# Patient Record
Sex: FEMALE | Race: WHITE | NOT HISPANIC OR LATINO | Employment: OTHER | URBAN - METROPOLITAN AREA
[De-identification: names, ages, dates, MRNs, and addresses within clinical notes are randomized per-mention and may not be internally consistent; named-entity substitution may affect disease eponyms.]

---

## 2017-02-14 DIAGNOSIS — Z12.31 ENCOUNTER FOR SCREENING MAMMOGRAM FOR MALIGNANT NEOPLASM OF BREAST: ICD-10-CM

## 2017-03-03 ENCOUNTER — HOSPITAL ENCOUNTER (OUTPATIENT)
Dept: RADIOLOGY | Age: 67
Discharge: HOME | End: 2017-03-03
Payer: COMMERCIAL

## 2017-03-03 DIAGNOSIS — Z12.31 ENCOUNTER FOR SCREENING MAMMOGRAM FOR MALIGNANT NEOPLASM OF BREAST: ICD-10-CM

## 2017-03-03 PROCEDURE — G0202 SCR MAMMO BI INCL CAD: HCPCS

## 2017-05-04 ENCOUNTER — TRANSCRIBE ORDERS (OUTPATIENT)
Dept: ADMINISTRATIVE | Facility: HOSPITAL | Age: 67
End: 2017-05-04

## 2017-05-04 ENCOUNTER — ALLSCRIPTS OFFICE VISIT (OUTPATIENT)
Dept: OTHER | Facility: OTHER | Age: 67
End: 2017-05-04

## 2017-05-04 DIAGNOSIS — N60.91 BENIGN MAMMARY DYSPLASIA OF RIGHT BREAST: Primary | ICD-10-CM

## 2017-05-08 ENCOUNTER — ALLSCRIPTS OFFICE VISIT (OUTPATIENT)
Dept: OTHER | Facility: OTHER | Age: 67
End: 2017-05-08

## 2017-05-08 DIAGNOSIS — Z01.419 ENCOUNTER FOR GYNECOLOGICAL EXAMINATION WITHOUT ABNORMAL FINDING: ICD-10-CM

## 2017-05-08 DIAGNOSIS — Z12.31 ENCOUNTER FOR SCREENING MAMMOGRAM FOR MALIGNANT NEOPLASM OF BREAST: ICD-10-CM

## 2017-05-08 PROCEDURE — 87624 HPV HI-RISK TYP POOLED RSLT: CPT | Performed by: OBSTETRICS & GYNECOLOGY

## 2017-05-08 PROCEDURE — G0145 SCR C/V CYTO,THINLAYER,RESCR: HCPCS | Performed by: OBSTETRICS & GYNECOLOGY

## 2017-05-09 ENCOUNTER — LAB REQUISITION (OUTPATIENT)
Dept: LAB | Facility: HOSPITAL | Age: 67
End: 2017-05-09
Payer: COMMERCIAL

## 2017-05-09 DIAGNOSIS — Z01.419 ENCOUNTER FOR GYNECOLOGICAL EXAMINATION WITHOUT ABNORMAL FINDING: ICD-10-CM

## 2017-05-12 LAB — HPV RRNA GENITAL QL NAA+PROBE: NORMAL

## 2017-05-15 LAB
LAB AP GYN PRIMARY INTERPRETATION: NORMAL
Lab: NORMAL

## 2017-05-16 ENCOUNTER — GENERIC CONVERSION - ENCOUNTER (OUTPATIENT)
Dept: OTHER | Facility: OTHER | Age: 67
End: 2017-05-16

## 2017-07-03 ENCOUNTER — ALLSCRIPTS OFFICE VISIT (OUTPATIENT)
Dept: OTHER | Facility: OTHER | Age: 67
End: 2017-07-03

## 2017-07-03 DIAGNOSIS — F41.9 ANXIETY DISORDER: ICD-10-CM

## 2017-07-03 DIAGNOSIS — M19.90 OSTEOARTHRITIS: ICD-10-CM

## 2017-07-10 ENCOUNTER — APPOINTMENT (OUTPATIENT)
Dept: LAB | Age: 67
End: 2017-07-10
Payer: COMMERCIAL

## 2017-07-10 ENCOUNTER — GENERIC CONVERSION - ENCOUNTER (OUTPATIENT)
Dept: OTHER | Facility: OTHER | Age: 67
End: 2017-07-10

## 2017-07-10 ENCOUNTER — TRANSCRIBE ORDERS (OUTPATIENT)
Dept: ADMINISTRATIVE | Age: 67
End: 2017-07-10

## 2017-07-10 DIAGNOSIS — F41.9 ANXIETY DISORDER: ICD-10-CM

## 2017-07-10 DIAGNOSIS — M19.90 OSTEOARTHRITIS: ICD-10-CM

## 2017-07-10 LAB
ALBUMIN SERPL BCP-MCNC: 4 G/DL (ref 3.5–5)
ALP SERPL-CCNC: 72 U/L (ref 46–116)
ALT SERPL W P-5'-P-CCNC: 18 U/L (ref 12–78)
ANION GAP SERPL CALCULATED.3IONS-SCNC: 7 MMOL/L (ref 4–13)
AST SERPL W P-5'-P-CCNC: 13 U/L (ref 5–45)
BASOPHILS # BLD AUTO: 0.02 THOUSANDS/ΜL (ref 0–0.1)
BASOPHILS NFR BLD AUTO: 0 % (ref 0–1)
BILIRUB SERPL-MCNC: 0.48 MG/DL (ref 0.2–1)
BUN SERPL-MCNC: 19 MG/DL (ref 5–25)
CALCIUM SERPL-MCNC: 9.2 MG/DL (ref 8.3–10.1)
CHLORIDE SERPL-SCNC: 106 MMOL/L (ref 100–108)
CHOLEST SERPL-MCNC: 201 MG/DL (ref 50–200)
CO2 SERPL-SCNC: 27 MMOL/L (ref 21–32)
CREAT SERPL-MCNC: 0.88 MG/DL (ref 0.6–1.3)
CRP SERPL QL: <3 MG/L
EOSINOPHIL # BLD AUTO: 0.11 THOUSAND/ΜL (ref 0–0.61)
EOSINOPHIL NFR BLD AUTO: 2 % (ref 0–6)
ERYTHROCYTE [DISTWIDTH] IN BLOOD BY AUTOMATED COUNT: 13.8 % (ref 11.6–15.1)
ERYTHROCYTE [SEDIMENTATION RATE] IN BLOOD: 5 MM/HOUR (ref 0–20)
GFR SERPL CREATININE-BSD FRML MDRD: >60 ML/MIN/1.73SQ M
GLUCOSE P FAST SERPL-MCNC: 99 MG/DL (ref 65–99)
HCT VFR BLD AUTO: 41.7 % (ref 34.8–46.1)
HDLC SERPL-MCNC: 90 MG/DL (ref 40–60)
HGB BLD-MCNC: 13.5 G/DL (ref 11.5–15.4)
LDLC SERPL CALC-MCNC: 93 MG/DL (ref 0–100)
LYMPHOCYTES # BLD AUTO: 2.59 THOUSANDS/ΜL (ref 0.6–4.47)
LYMPHOCYTES NFR BLD AUTO: 44 % (ref 14–44)
MCH RBC QN AUTO: 29.5 PG (ref 26.8–34.3)
MCHC RBC AUTO-ENTMCNC: 32.4 G/DL (ref 31.4–37.4)
MCV RBC AUTO: 91 FL (ref 82–98)
MONOCYTES # BLD AUTO: 0.47 THOUSAND/ΜL (ref 0.17–1.22)
MONOCYTES NFR BLD AUTO: 8 % (ref 4–12)
NEUTROPHILS # BLD AUTO: 2.76 THOUSANDS/ΜL (ref 1.85–7.62)
NEUTS SEG NFR BLD AUTO: 46 % (ref 43–75)
NRBC BLD AUTO-RTO: 0 /100 WBCS
PLATELET # BLD AUTO: 309 THOUSANDS/UL (ref 149–390)
PMV BLD AUTO: 9.8 FL (ref 8.9–12.7)
POTASSIUM SERPL-SCNC: 4.2 MMOL/L (ref 3.5–5.3)
PROT SERPL-MCNC: 6.8 G/DL (ref 6.4–8.2)
RBC # BLD AUTO: 4.58 MILLION/UL (ref 3.81–5.12)
SODIUM SERPL-SCNC: 140 MMOL/L (ref 136–145)
TRIGL SERPL-MCNC: 88 MG/DL
TSH SERPL DL<=0.05 MIU/L-ACNC: 2.46 UIU/ML (ref 0.36–3.74)
WBC # BLD AUTO: 5.96 THOUSAND/UL (ref 4.31–10.16)

## 2017-07-10 PROCEDURE — 85025 COMPLETE CBC W/AUTO DIFF WBC: CPT

## 2017-07-10 PROCEDURE — 80061 LIPID PANEL: CPT

## 2017-07-10 PROCEDURE — 80053 COMPREHEN METABOLIC PANEL: CPT

## 2017-07-10 PROCEDURE — 86140 C-REACTIVE PROTEIN: CPT

## 2017-07-10 PROCEDURE — 84443 ASSAY THYROID STIM HORMONE: CPT

## 2017-07-10 PROCEDURE — 36415 COLL VENOUS BLD VENIPUNCTURE: CPT

## 2017-07-10 PROCEDURE — 86038 ANTINUCLEAR ANTIBODIES: CPT

## 2017-07-10 PROCEDURE — 85652 RBC SED RATE AUTOMATED: CPT

## 2017-07-10 PROCEDURE — 81374 HLA I TYPING 1 ANTIGEN LR: CPT

## 2017-07-12 LAB — RYE IGE QN: NEGATIVE

## 2017-07-14 LAB — HLA-B27 QL NAA+PROBE: NEGATIVE

## 2017-10-22 DIAGNOSIS — N60.99 BENIGN MAMMARY DYSPLASIA OF BREAST: ICD-10-CM

## 2017-11-15 ENCOUNTER — ALLSCRIPTS OFFICE VISIT (OUTPATIENT)
Dept: OTHER | Facility: OTHER | Age: 67
End: 2017-11-15

## 2017-11-15 ENCOUNTER — APPOINTMENT (OUTPATIENT)
Dept: LAB | Age: 67
End: 2017-11-15
Payer: COMMERCIAL

## 2017-11-15 ENCOUNTER — TRANSCRIBE ORDERS (OUTPATIENT)
Dept: URGENT CARE | Age: 67
End: 2017-11-15

## 2017-11-15 DIAGNOSIS — N60.99 BENIGN MAMMARY DYSPLASIA OF BREAST: ICD-10-CM

## 2017-11-15 LAB
BUN SERPL-MCNC: 20 MG/DL (ref 5–25)
CREAT SERPL-MCNC: 0.95 MG/DL (ref 0.6–1.3)
GFR SERPL CREATININE-BSD FRML MDRD: 63 ML/MIN/1.73SQ M

## 2017-11-15 PROCEDURE — 36415 COLL VENOUS BLD VENIPUNCTURE: CPT

## 2017-11-15 PROCEDURE — 84520 ASSAY OF UREA NITROGEN: CPT

## 2017-11-15 PROCEDURE — 82565 ASSAY OF CREATININE: CPT

## 2017-12-04 ENCOUNTER — HOSPITAL ENCOUNTER (OUTPATIENT)
Dept: RADIOLOGY | Facility: HOSPITAL | Age: 67
Discharge: HOME | End: 2017-12-04
Payer: COMMERCIAL

## 2017-12-04 DIAGNOSIS — N60.99 BENIGN MAMMARY DYSPLASIA OF BREAST: ICD-10-CM

## 2017-12-04 PROCEDURE — A9585 GADOBUTROL INJECTION: HCPCS | Performed by: RADIOLOGY

## 2017-12-04 PROCEDURE — C8908 MRI W/O FOL W/CONT, BREAST,: HCPCS

## 2017-12-04 RX ADMIN — GADOBUTROL 7 ML: 604.72 INJECTION INTRAVENOUS at 17:57

## 2017-12-11 ENCOUNTER — GENERIC CONVERSION - ENCOUNTER (OUTPATIENT)
Dept: OTHER | Facility: OTHER | Age: 67
End: 2017-12-11

## 2017-12-11 ENCOUNTER — TRANSCRIBE ORDERS (OUTPATIENT)
Dept: ADMINISTRATIVE | Facility: HOSPITAL | Age: 67
End: 2017-12-11

## 2017-12-11 DIAGNOSIS — N60.99 BENIGN MAMMARY DYSPLASIA, UNSPECIFIED LATERALITY: Primary | ICD-10-CM

## 2017-12-12 NOTE — PROGRESS NOTES
Assessment    1  Breast ductal hyperplasia, atypical (610 8) (N60 99)   2  Visit for screening mammogram (V76 12) (Z12 31)    Plan  Breast ductal hyperplasia, atypical    · 1 - Akiko KENT, [facility] Co-Management  *  Status: Active -Retrospective By Protocol Authorization  Requested for: 17MGZ3955   Ordered;Breast ductal hyperplasia, atypical; Ordered By: Ede Wilson Performed:  Due: 49BYX4923; Last Updated By: Mari Roldan; 12/7/2017 9:16:58 AM  Care Summary provided  : Yes   · (1) BUN; Status:Active - Retrospective By Protocol Authorization; Requestedfor:26Nov2018; Perform:[facility]; QQT:[id number]; Last Updated David Zipwhips; 12/7/2017 9:18:24 AM;Ordered;ductal hyperplasia, atypical; Ordered By:Devante Reyes;   · (1) CREATININE; Status:Active - Retrospective By Protocol Authorization; Requestedfor:26Nov2018; Perform:[facility]; NBO:[id number]; Last Updated David Zipwhips; 12/7/2017 9:18:24 AM;Ordered;ductal hyperplasia, atypical; Ordered By:Devante Reyes;   · * MRI BREAST BILATERAL W OR WO CONTRAST; Status:Need Information - FinancialAuthorization,Retrospective By Protocol Authorization; Requested for:62Vfz0570; Perform:[facility]; IUB:[id number]; Last Updated David Zipwhips; 12/7/2017 9:18:24 AM;Ordered;ductal hyperplasia, atypical; Ordered By:Devante Reyes;  Visit for screening mammogram    · * MAMMO SCREENING BILATERAL W CAD; Status:Hold For - Scheduling,RetrospectiveBy Protocol Authorization; Requested for:05Mar2018; Perform:[facility]; TCX:[id number]; Last Updated David Zipwhips; 12/7/2017 9:16:58 AM;Ordered;for screening mammogram; Ordered By:Devante Reyes;    Discussion/Summary  Discussion Summary:   The patient has normal clinical breast exam  She has no worrisome findings on her imaging  I did review with her that there is some question regarding the benefit of the MRI some people with atypical ductal hyperplasia   We agreed to perform her next MRI but have a further discussion regarding stopping these MRIs if her family history is not justified them  She is agreeable to this approach  We'll see her back in one year's time  Chief Complaint  Chief Complaint Free Text Note Form: Six month hi risk follow up  Bilateral screening mammogram performed on 3/3/2017, birads 1  Bilateral breast MRI performed on 12/4/2017, birads 2  No change in family history for cancer  Patient performs self breast exams and has no new concerns  History of Present Illness  Diagnosis and Staging: October 2010: atypical ductal hyperplasia, left breast  lifetime risk is 29 700% and her NCI lifetime risk is 22 9%    Treatment History: 2010: Left breast lumpectomy  excision left breast- fibroadenoma    Current Therapy: Yearly mammograms in [address] [place] [address] [place] [facility] in December    Interval History: Patient has no complaints referable to her breasts  Her imaging showed no worrisome findings      Review of Systems  Complete Female ROS SurgOnc:  Constitutional: The patient denies new or recent history of general fatigue, no recent weight loss, no change in appetite  Eyes: No complaints of visual problems, no scleral icterus  ENT: no complaints of ear pain, no hoarseness, no difficulty swallowing,-- no tinnitus-- and-- no new masses in head, oral cavity, or neck  Cardiovascular: No complaints of chest pain, no palpitations, no ankle edema  Respiratory: No complaints of shortness of breath, no cough  Gastrointestinal: No complaints of jaundice, no bloody stools, no pale stools  Genitourinary: No complaints of dysuria, no hematuria, no nocturia, no frequent urination, no urethral discharge  Musculoskeletal: No complaints of weakness, paralysis, joint stiffness or arthralgias,  Integumentary: No complaints of rash, no new lesions  Neurological: No complaints of convulsions, no seizures, no dizziness    Hematologic/Lymphatic: No complaints of easy bruising  ROS Reviewed:   ROS reviewed  Active Problems  1  Anxiety (300 00) (F41 9)   2  Arthritis (716 90) (M19 90)   3  Breast ductal hyperplasia, atypical (610 8) (N60 99)   4  Headache, unspecified headache type (784 0) (R51)   5  Malignant Melanoma In Precancerous Melanosis Of The Skin (172 9)   6  Vasovagal syncope (780 2) (R55)   7  Visit for screening mammogram (V76 12) (Z12 31)    Past Medical History    1  History of Age At First Period 17 Years Old (Menarche)   2  Breast ductal hyperplasia, atypical (610 8) (N60 99)   3  History of Colon cancer screening (V76 51) (Z12 11)   4  History of Depression screening (V79 0) (Z13 89)   5  History of Encounter for special screening examination for genitourinary disorder (V81 6) (Z13 89)   6  History of Encounter for well woman exam with routine gynecological exam (V72 31) (Z01 419)   7  History of melanoma in situ (V10 82) (Z86 008)   8  History of migraine (V12 49) (Z86 69)   9  History of screening mammography (V15 89) (Z92 89)   10  History of Special screening for other neurological conditions (V80 09) (Z13 89)    Surgical History  1  History of Ankle Surgery   2  History of Left Breast Lumpectomy   3  History of Tonsillectomy  Surgical History Reviewed: The surgical history was reviewed and updated today  Family History  Mother    1  Family history of hypertension (V17 49) (Z82 49)  Father    2  Family history of depression (V17 0) (Z81 8)   3  Family history of mental disorder (V17 0) (Z81 8)  Maternal Grandmother    4  Family history of cerebrovascular accident (CVA) (V17 1) (Z82 3)  Paternal Grandfather    11  Family history of Carcinoma Of The Pancreas  Paternal Aunt    6  FH: ovarian cancer (V16 41) (Z80 41)  Other    7  Family history of colon cancer (V16 0) (Z80 0)  Family History Reviewed: The family history was reviewed and updated today         Social History     · Alcohol Use (History)   · Denied: History of Drug use   · Former smoker (B13 15) (T28 660)   · Social alcohol use (Z78 9)  Social History Reviewed: The social history was reviewed and is unchanged  Current Meds   1  ALPRAZolam 0 5 MG Oral Tablet; TAKE 1 TABLET EVERY 6 TO 8 HOURS AS NEEDED; Therapy: 35WUT6949 to (Evaluate:57Neo9567); Last Rx:61Ypw8040 Ordered   2  Calcium 600 MG Oral Tablet; TAKE 1 TABLET DAILY; Therapy: 27FDD2706 to Recorded   3  Celecoxib 100 MG Oral Capsule; Take one capsule every 12 hours as needed for pain; Therapy: 36QLX3363 to (03 17 74 30 53)  Requested for: 55URE8912; Last Rx:23Oqr7971 Ordered   4  Ibuprofen 200 MG Oral Tablet; Take 1 tablet 4 times daily; Therapy: 23FLD2391 to Recorded   5  Vitamin D3 1000 UNIT Oral Capsule; Take 1 daily; Therapy: 02FPV9075 to Recorded  Medication List Reviewed: The medication list was reviewed and updated today  Allergies  1  Aleve TABS   2  Erythromycin TABS   3  Naproxen TABS  4  No Known Environmental Allergies   5  No Known Food Allergies    Vitals  Vital Signs    Recorded: 17UKW5198 09:26AM   Temperature 98 1 F   Heart Rate 70   Respiration 16   Systolic 020   Diastolic 70   Height 5 ft 3 8 in   Weight 173 lb 6 08 oz   BMI Calculated 29 95   BSA Calculated 1 84       Physical Exam   Additional Exam:  Both breasts were examined in the sitting and supine position  There are no worrisome skin lesions or nipple discharge on either side  There are no dominant masses, axillary adenopathy or supraclavicular adenopathy on either side  End of Encounter Meds    1  ALPRAZolam 0 5 MG Oral Tablet; TAKE 1 TABLET EVERY 6 TO 8 HOURS AS NEEDED; Therapy: 01XUO1399 to (Evaluate:57Iyd7199); Last Rx:38Tgf0911 Ordered    2  Celecoxib 100 MG Oral Capsule (CeleBREX); Take one capsule every 12 hours as needed for pain; Therapy: 15QFE5751 to (03 17 74 30 53)  Requested for: 36JOG5986; Last Rx:78Iqm2614 Ordered    3  Calcium 600 MG Oral Tablet; TAKE 1 TABLET DAILY; Therapy: 69ZFG3629 to Recorded   4   Ibuprofen 200 MG Oral Tablet; Take 1 tablet 4 times daily; Therapy: 49TFP7184 to Recorded   5  Vitamin D3 1000 UNIT Oral Capsule; Take 1 daily;  Therapy: 69PWD0596 to Recorded    Signatures   Electronically signed by : Reddy Menjivar MD; Dec 11 2017 10:28AM EST                       (Author)

## 2018-01-10 NOTE — RESULT NOTES
Verified Results  (1) THIN PREP PAP WITH IMAGING 62VUU6707 12:00AM [facility] [address]     Test Name Result Flag Reference   LAB AP CASE REPORT (Report)     Gynecologic Cytology Report            Case: [id number]                  Authorizing Provider: Mahin Galan MD    Collected:      05/08/2017           First Screen:     RAFI Arias    Received:      05/10/2017 1238        Specimen:  LIQUID-BASED PAP, SCREENING, Endocervical   HPV HIGH RISK RESULT (Report)     HPV, High Risk: HPV NEG, HPV16 NEG, HPV18 NEG      Other High Risk HPV Negative, HPV 16 Negative, HPV 18 Negative  HPV types: 16,18,31,33,35,39,45,51,52,56,58,59,66 and 68 DNA are undetectable or below the pre-set threshold  Roche???s FDA approved Gena 4800 is utilized with strict adherence to the ???s instruction  manual to test for the presence of High-Risk HPV DNA, as well as HPV 16 and HPV 18  This instrument  has been validated by our laboratory and/or by the   A negative result does not preclude the presence of HPV infection because results depend on adequate  specimen collection, absence of inhibitors and sufficient DNA to be detected  Additionally, HPV negative  results are not intended to prevent women from proceeding to colposcopy if clinically warranted  Positive HPV test results indicate the presence of any one or more of the high risk types, but since patients  are often co-infected with low-risk types it does not rule out the presence of low-risk types in patients  with mixed infections  LAB AP GYN PRIMARY INTERPRETATION      Negative for intraepithelial lesion or malignancy  Electronically signed by RAFI Arias on 5/15/2017 at 3:04 PM   LAB AP GYN SPECIMEN ADEQUACY      Satisfactory for evaluation  Endocervical/transformation zone component present     LAB AP GYN ADDITIONAL INFORMATION (Report)     [employer]'s FDA approved ,  and ThinPrep Imaging System are   utilized with strict adherence to the 's instruction manual to   prepare gynecologic and non-gynecologic cytology specimens for the   production of ThinPrep slides as well as for gynecologic ThinPrep imaging  These processes have been validated by our laboratory and/or by the     The Pap test is not a diagnostic procedure and should not be used as the   sole means to detect cervical cancer  It is only a screening procedure to   aid in the detection of cervical cancer and its precursors  Both   false-negative and false-positive results have been experienced  Your   patient's test result should be interpreted in this context together with   the history and clinical findings         Signatures   Electronically signed by : JORGE Frye ; May 16 2017  8:50AM EST                       (Author)

## 2018-01-13 VITALS
BODY MASS INDEX: 29.88 KG/M2 | DIASTOLIC BLOOD PRESSURE: 92 MMHG | HEART RATE: 66 BPM | WEIGHT: 175 LBS | HEIGHT: 64 IN | SYSTOLIC BLOOD PRESSURE: 136 MMHG

## 2018-01-13 VITALS
WEIGHT: 175.5 LBS | HEIGHT: 64 IN | BODY MASS INDEX: 29.96 KG/M2 | RESPIRATION RATE: 18 BRPM | TEMPERATURE: 98.4 F | DIASTOLIC BLOOD PRESSURE: 80 MMHG | HEART RATE: 70 BPM | SYSTOLIC BLOOD PRESSURE: 130 MMHG

## 2018-01-13 NOTE — RESULT NOTES
Verified Results  (1) CBC/PLT/DIFF 56FKK1060 08:13RAJEEV Feliciano Order Number: [id number]     Test Name Result Flag Reference   WBC COUNT 5 96 Thousand/uL  4 31-10 16   RBC COUNT 4 58 Million/uL  3 81-5 12   HEMOGLOBIN 13 5 g/dL  11 5-15 4   HEMATOCRIT 41 7 %  34 8-46  1   MCV 91 fL  82-98   MCH 29 5 pg  26 8-34 3   MCHC 32 4 g/dL  31 4-37 4   RDW 13 8 %  11 6-15 1   MPV 9 8 fL  8 9-12 7   PLATELET COUNT 913 Thousands/uL  149-390   nRBC AUTOMATED 0 /100 WBCs     NEUTROPHILS RELATIVE PERCENT 46 %  43-75   LYMPHOCYTES RELATIVE PERCENT 44 %  14-44   MONOCYTES RELATIVE PERCENT 8 %  4-12   EOSINOPHILS RELATIVE PERCENT 2 %  0-6   BASOPHILS RELATIVE PERCENT 0 %  0-1   NEUTROPHILS ABSOLUTE COUNT 2 76 Thousands/? ??L  1 85-7 62   LYMPHOCYTES ABSOLUTE COUNT 2 59 Thousands/? ??L  0 60-4 47   MONOCYTES ABSOLUTE COUNT 0 47 Thousand/? ??L  0 17-1 22   EOSINOPHILS ABSOLUTE COUNT 0 11 Thousand/? ??L  0 00-0 61   BASOPHILS ABSOLUTE COUNT 0 02 Thousands/? ??L  0 00-0 10     (1) COMPREHENSIVE METABOLIC PANEL 94CIN9187 78:45OE Fernando Feliciano Order Number: [id number]     Test Name Result Flag Reference   SODIUM 140 mmol/L  136-145   POTASSIUM 4 2 mmol/L  3 5-5 3   CHLORIDE 106 mmol/L  100-108   CARBON DIOXIDE 27 mmol/L  21-32   ANION GAP (CALC) 7 mmol/L  4-13   BLOOD UREA NITROGEN 19 mg/dL  5-25   CREATININE 0 88 mg/dL  0 60-1 30   Standardized to IDMS reference method   CALCIUM 9 2 mg/dL  8 3-10 1   BILI, TOTAL 0 48 mg/dL  0 20-1 00   ALK PHOSPHATAS 72 U/L     ALT (SGPT) 18 U/L  12-78   AST(SGOT) 13 U/L  5-45   ALBUMIN 4 0 g/dL  3 5-5 0   TOTAL PROTEIN 6 8 g/dL  6 4-8 2   eGFR Non-African American      >60 0 ml/min/1 73sq [facility] Disease Education Program recommendations are as follows:  GFR calculation is accurate only with a steady state creatinine  Chronic Kidney disease less than 60 ml/min/1 73 sq  meters  Kidney failure less than 15 ml/min/1 73 sq  meters     GLUCOSE FASTING 99 mg/dL 65-99     (1) C-REACTIVE PROTEIN 10Jul2017 08:13AM [employer] Order Number: [id number]     Test Name Result Flag Reference   C-REACT PROTEIN <3 0 mg/L  <3 0     (1) LIPID PANEL, FASTING 10Jul2017 08:13AM [employer] Order Number: [id number]     Test Name Result Flag Reference   CHOLESTEROL 201 mg/dL H    HDL,DIRECT 90 mg/dL H 40-60   Specimen collection should occur prior to Metamizole administration due to the potential for falsely depressed results  LDL CHOLESTEROL CALCULATED 93 mg/dL  0-100   Triglyceride:         Normal              <150 mg/dl       Borderline High    150-199 mg/dl       High               200-499 mg/dl       Very High          >499 mg/dl  Cholesterol:         Desirable        <200 mg/dl      Borderline High  200-239 mg/dl      High             >239 mg/dl  HDL Cholesterol:        High    >59 mg/dL      Low     <41 mg/dL  LDL CALCULATED:    This screening LDL is a calculated result  It does not have the accuracy of the Direct Measured LDL in the monitoring of patients with hyperlipidemia and/or statin therapy  Direct Measure LDL (NHY462) must be ordered separately in these patients  TRIGLYCERIDES 88 mg/dL  <=150   Specimen collection should occur prior to N-Acetylcysteine or Metamizole administration due to the potential for falsely depressed results  (1) SED RATE 10Jul2017 08:13AM [employer] Order Number: [id number]     Test Name Result Flag Reference   SED RATE 5 mm/hour  0-20     (1) TSH 61HAY4233 08:13AM [employer] Order Number: [id number]     Test Name Result Flag Reference   TSH 2 460 uIU/mL  0 358-3 740   Patients undergoing fluorescein dye angiography may retain small amounts of fluorescein in the body for 48-72 hours post procedure  Samples containing fluorescein can produce falsely depressed TSH values   If the patient had this procedure,a specimen should be resubmitted post fluorescein clearance            The recommended reference ranges for TSH during pregnancy are as follows:  First trimester 0 1 to 2 5 uIU/mL  Second trimester  0 2 to 3 0 uIU/mL  Third trimester 0 3 to 3 0 uIU/m

## 2018-01-14 VITALS
DIASTOLIC BLOOD PRESSURE: 90 MMHG | SYSTOLIC BLOOD PRESSURE: 120 MMHG | WEIGHT: 171.38 LBS | BODY MASS INDEX: 29.26 KG/M2 | HEIGHT: 64 IN

## 2018-01-24 VITALS
WEIGHT: 173.38 LBS | TEMPERATURE: 98.1 F | HEIGHT: 64 IN | RESPIRATION RATE: 16 BRPM | HEART RATE: 70 BPM | BODY MASS INDEX: 29.6 KG/M2 | SYSTOLIC BLOOD PRESSURE: 138 MMHG | DIASTOLIC BLOOD PRESSURE: 70 MMHG

## 2018-03-05 DIAGNOSIS — Z12.31 ENCOUNTER FOR SCREENING MAMMOGRAM FOR MALIGNANT NEOPLASM OF BREAST: ICD-10-CM

## 2018-04-19 ENCOUNTER — HOSPITAL ENCOUNTER (OUTPATIENT)
Dept: RADIOLOGY | Age: 68
Discharge: HOME | End: 2018-04-19
Payer: COMMERCIAL

## 2018-04-19 DIAGNOSIS — Z12.31 ENCOUNTER FOR SCREENING MAMMOGRAM FOR MALIGNANT NEOPLASM OF BREAST: ICD-10-CM

## 2018-04-19 PROCEDURE — 77067 SCR MAMMO BI INCL CAD: CPT

## 2018-11-26 DIAGNOSIS — N60.99 BENIGN MAMMARY DYSPLASIA OF BREAST: ICD-10-CM

## 2018-12-04 ENCOUNTER — APPOINTMENT (OUTPATIENT)
Dept: LAB | Age: 68
End: 2018-12-04
Payer: COMMERCIAL

## 2018-12-04 DIAGNOSIS — N60.99 BENIGN MAMMARY DYSPLASIA OF BREAST: ICD-10-CM

## 2018-12-04 LAB
BUN SERPL-MCNC: 26 MG/DL (ref 5–25)
CREAT SERPL-MCNC: 0.96 MG/DL (ref 0.6–1.3)
GFR SERPL CREATININE-BSD FRML MDRD: 62 ML/MIN/1.73SQ M

## 2018-12-04 PROCEDURE — 36415 COLL VENOUS BLD VENIPUNCTURE: CPT

## 2018-12-04 PROCEDURE — 84520 ASSAY OF UREA NITROGEN: CPT

## 2018-12-04 PROCEDURE — 82565 ASSAY OF CREATININE: CPT

## 2018-12-27 ENCOUNTER — HOSPITAL ENCOUNTER (OUTPATIENT)
Dept: RADIOLOGY | Facility: HOSPITAL | Age: 68
Discharge: HOME | End: 2018-12-27
Attending: SURGERY
Payer: COMMERCIAL

## 2018-12-27 DIAGNOSIS — N60.99 BENIGN MAMMARY DYSPLASIA, UNSPECIFIED LATERALITY: ICD-10-CM

## 2018-12-27 PROCEDURE — C8908 MRI W/O FOL W/CONT, BREAST,: HCPCS

## 2018-12-27 PROCEDURE — A9585 GADOBUTROL INJECTION: HCPCS | Performed by: SURGERY

## 2018-12-27 PROCEDURE — 0159T HB CAD BREAST MRI: CPT

## 2018-12-27 RX ADMIN — GADOBUTROL 7 ML: 604.72 INJECTION INTRAVENOUS at 19:45

## 2019-01-02 ENCOUNTER — OFFICE VISIT (OUTPATIENT)
Dept: SURGICAL ONCOLOGY | Facility: CLINIC | Age: 69
End: 2019-01-02
Payer: COMMERCIAL

## 2019-01-02 VITALS
TEMPERATURE: 98 F | WEIGHT: 177 LBS | DIASTOLIC BLOOD PRESSURE: 74 MMHG | HEART RATE: 77 BPM | HEIGHT: 64 IN | SYSTOLIC BLOOD PRESSURE: 128 MMHG | BODY MASS INDEX: 30.22 KG/M2 | RESPIRATION RATE: 16 BRPM

## 2019-01-02 DIAGNOSIS — Z91.89 INCREASED RISK OF BREAST CANCER: ICD-10-CM

## 2019-01-02 DIAGNOSIS — Z12.31 VISIT FOR SCREENING MAMMOGRAM: ICD-10-CM

## 2019-01-02 DIAGNOSIS — N60.99 BREAST DUCTAL HYPERPLASIA, ATYPICAL: Primary | ICD-10-CM

## 2019-01-02 DIAGNOSIS — R92.2 DENSE BREASTS: ICD-10-CM

## 2019-01-02 PROBLEM — F41.9 ANXIETY: Status: ACTIVE | Noted: 2017-07-03

## 2019-01-02 PROBLEM — R51.9 HEADACHE, UNSPECIFIED HEADACHE TYPE: Status: ACTIVE | Noted: 2017-07-03

## 2019-01-02 PROBLEM — M19.90 ARTHRITIS: Status: ACTIVE | Noted: 2017-07-03

## 2019-01-02 PROCEDURE — 99213 OFFICE O/P EST LOW 20 MIN: CPT | Performed by: NURSE PRACTITIONER

## 2019-01-02 RX ORDER — IBUPROFEN 200 MG
1 TABLET ORAL 4 TIMES DAILY
COMMUNITY
Start: 2017-07-03 | End: 2021-04-15

## 2019-01-02 RX ORDER — ALPRAZOLAM 0.5 MG/1
0.5 TABLET ORAL
COMMUNITY
Start: 2012-01-20 | End: 2021-08-17

## 2019-01-02 NOTE — PROGRESS NOTES
Surgical Oncology Follow Up       [address]  [facility] [place] [address] [address]  1950  [phone]      Chief Complaint   Patient presents with    Follow-up     1 year follow up increased risk of breast cancer       Assessment/Plan:  1  Breast ductal hyperplasia, atypical  - 1 year f/u visit  - Clinical breast exam by GYN in approx 6 mo    2  Visit for screening mammogram  - Mammo screening bilateral w 3d & cad; Future    3  Dense breasts  - US breast screening bilateral complete (ABUS); Future    4  Increased risk of breast cancer  - US breast screening bilateral complete (ABUS); Future      Discussion/Summary: Patient is a 33-year-old female who presents today for a one-year follow-up for increased risk of breast cancer and a personal history of atypical ductal hyperplasia  She had underwent a left breast lumpectomy in 2010 and an excision of fibroadenomas in 2012  Her Tyrer-Cuzick lifetime risk is 28 600% and her NCI lifetime risk is 22 1%  She had a bilateral screening mammogram performed on April 19, 2018 which was BI-RADS 1  She had a bilateral breast MRI performed on December 27, 2018 which was BI-RADS 2  She has no new complaints today  She reports occasional right breast discomfort which is alleviated by taking evening Primrose oil  She notices no changes on her self breast exam   There are no worrisome findings on today's exam   We again discussed risk calculators and increased screening regimens based on her calculated risk  She has no family history of breast cancer  We ultimately decided to pursue an automated breast ultrasound in 1 year and alternate this with every other year MRIs  I will order a bilateral 3D screening mammogram for April 2018  She will be seeing her gynecologist for a clinical breast exam in the upcoming months    Therefore, we will plan to see the patient back in 1 year or sooner if the need arises  She was instructed to call with any new concerns or symptoms  All of her questions were answered  History of Present Illness:     -Interval History:  Patient presents today for a 1 year follow-up visit for an increased risk of breast cancer due to atypical ductal hyperplasia diagnosed in 2010  She has no new complaints today  Review of Systems:  Review of Systems   Constitutional: Negative for activity change, appetite change, chills, fatigue, fever and unexpected weight change  HENT: Negative for trouble swallowing  Eyes: Negative for pain, redness and visual disturbance  Respiratory: Negative for cough, shortness of breath and wheezing  Cardiovascular: Negative for chest pain, palpitations and leg swelling  Gastrointestinal: Negative for abdominal pain, constipation, diarrhea, nausea and vomiting  Endocrine: Negative for cold intolerance and heat intolerance  Musculoskeletal: Negative for arthralgias, back pain, gait problem and myalgias  Skin: Negative for color change and rash  Neurological: Negative for dizziness, syncope, light-headedness, numbness and headaches  Hematological: Negative for adenopathy  Psychiatric/Behavioral: Negative for agitation and confusion  All other systems reviewed and are negative  Patient Active Problem List   Diagnosis    Anxiety    Arthritis    Breast ductal hyperplasia, atypical    Esophageal reflux    Headache, unspecified headache type    Melanoma of skin ([facility] [address] )    Vasovagal syncope     History reviewed  No pertinent past medical history  History reviewed  No pertinent surgical history  History reviewed  No pertinent family history  Social History     Social History    Marital status: /Civil Union     Spouse name: N/A    Number of children: N/A    Years of education: N/A     Occupational History    Not on file       Social History Main Topics    Smoking status: Not on file    Smokeless tobacco: Not on file    Alcohol use Not on file    Drug use: Unknown    Sexual activity: Not on file     Other Topics Concern    Not on file     Social History Narrative    No narrative on file       Current Outpatient Prescriptions:     ALPRAZolam (XANAX) 0 5 mg tablet, 0 5 mg, Disp: , Rfl:     EVENING PRIMROSE OIL PO, Take by mouth, Disp: , Rfl:     ibuprofen (MOTRIN) 200 mg tablet, Take 1 tablet by mouth 4 (four) times a day, Disp: , Rfl:   Allergies   Allergen Reactions    Erythromycin Other (See Comments)     Other reaction(s): trouble breathing    Naproxen Abdominal Pain     Vitals:    01/02/19 0759   BP: 128/74   Pulse: 77   Resp: 16   Temp: 98 °F (36 7 °C)       Physical Exam   Constitutional: She is oriented to person, place, and time  Vital signs are normal  She appears well-developed and well-nourished  No distress  HENT:   Head: Normocephalic and atraumatic  Neck: Normal range of motion  Cardiovascular: Normal rate, regular rhythm and normal heart sounds  Pulmonary/Chest: Effort normal and breath sounds normal    Bilateral breasts were examined in the sitting and supine position  Left breast surgical scar  There are no masses, skin nodules, nipple changes or nipple discharge  There is no bilateral supraclavicular or axillary lymphadenopathy noted  Abdominal: Soft  Normal appearance  She exhibits no mass  There is no hepatosplenomegaly  There is no tenderness  Musculoskeletal: Normal range of motion  Lymphadenopathy:     She has no axillary adenopathy  Right: No supraclavicular adenopathy present  Left: No supraclavicular adenopathy present  Neurological: She is alert and oriented to person, place, and time  Skin: Skin is warm, dry and intact  No rash noted  She is not diaphoretic  Psychiatric: She has a normal mood and affect  Her speech is normal    Vitals reviewed      Results:    Imaging  Mri Breast Bilateral W Or Wo Contrast    Result Date: 12/28/2018  Narrative: DIAGNOSIS: Benign mammary dysplasia, unspecified laterality RELEVANT HISTORY: Family Breast Cancer History: No known family history of breast cancer  Family Medical history: No relevant family history has been documented for this patient  Personal History: No relevant hormone history has been documented for this patient  No relevant surgical history has been documented for this patient  No relevant medical history has been documented for this patient  COMPARISONS: N/A INDICATION: Elvia Parham is a 79 y o  female presenting for N60 99  TECHNOLOGIST: Mis Woods TECHNIQUE: MRI of both breasts was performed in axial and sagittal planes utilizing a combination of axial diffusion, fat suppressed sagittal T2 , gradient echo in phase T1 weighting followed by sagittal dynamic post contrast imaging with 3D fat suppressed gradient-echo T1 sequences (VIBRANT) at 1 5 minute intervals  Axial VIBRANT post contrast images were obtained  Sagittal subtraction images were generated  This exam was read in conjunction with [employer] [place] software  Intravenous contrast was administered at 2cc/sec, without documented contrast reaction  MEDICATIONS ADMINISTERED: gadobutrol injection (MULTI-DOSE) SOLN 7 mL, Total Given: 7 mL (1 dose) TISSUE DENSITY: FGT: The breasts have heterogeneous fibroglandular tissue  BPE: The background parenchymal enhancement is minimal and symmetric  RISK ASSESSMENT: 5 Year Tyrer-Cuzick: 2 87 % 10 Year Tyrer-Cuzick: 5 41 % Lifetime Tyrer-Cuzick: 10 68 % FINDINGS: Bilateral There are no suspicious enhancing masses or suspicious areas of nonmass enhancement  There are few scattered foci of enhancement bilaterally  Two well-circumscribed benign-appearing masses are again identified in the upper outer right breast with biopsy clips present  There is no axillary or internal mammary adenopathy  No suspicious enhancement in the adjacent soft tissues and osseous structures       Impression: No evidence of malignancy or significant change from prior exam  ASSESSMENT/BI-RADS CATEGORY: Left: 2 - Benign Right: 2 - Benign Overall: 2 - Benign RECOMMENDATION:      - Routine Screening Mammogram in 1 year for both breasts  Workstation ID: [id number]      I reviewed the above imaging data  Advance Care Planning/Advance Directives:  Discussed disease status and treatment goals with the patient

## 2019-04-24 ENCOUNTER — HOSPITAL ENCOUNTER (OUTPATIENT)
Dept: RADIOLOGY | Age: 69
Discharge: HOME | End: 2019-04-24
Payer: COMMERCIAL

## 2019-04-24 VITALS — BODY MASS INDEX: 29.02 KG/M2 | WEIGHT: 170 LBS | HEIGHT: 64 IN

## 2019-04-24 DIAGNOSIS — Z12.31 VISIT FOR SCREENING MAMMOGRAM: ICD-10-CM

## 2019-04-24 PROCEDURE — 77063 BREAST TOMOSYNTHESIS BI: CPT

## 2019-04-24 PROCEDURE — 77067 SCR MAMMO BI INCL CAD: CPT

## 2019-12-30 ENCOUNTER — HOSPITAL ENCOUNTER (OUTPATIENT)
Dept: ULTRASOUND IMAGING | Facility: CLINIC | Age: 69
Discharge: HOME | End: 2019-12-30
Payer: COMMERCIAL

## 2019-12-30 VITALS — BODY MASS INDEX: 29.88 KG/M2 | HEIGHT: 64 IN | WEIGHT: 175 LBS

## 2019-12-30 DIAGNOSIS — R92.2 DENSE BREASTS: ICD-10-CM

## 2019-12-30 DIAGNOSIS — Z91.89 INCREASED RISK OF BREAST CANCER: ICD-10-CM

## 2019-12-30 PROCEDURE — 76377 3D RENDER W/INTRP POSTPROCES: CPT

## 2019-12-30 PROCEDURE — 76641 ULTRASOUND BREAST COMPLETE: CPT

## 2020-01-02 ENCOUNTER — OFFICE VISIT (OUTPATIENT)
Dept: SURGICAL ONCOLOGY | Facility: CLINIC | Age: 70
End: 2020-01-02
Payer: COMMERCIAL

## 2020-01-02 VITALS
HEIGHT: 64 IN | SYSTOLIC BLOOD PRESSURE: 142 MMHG | DIASTOLIC BLOOD PRESSURE: 90 MMHG | HEART RATE: 68 BPM | BODY MASS INDEX: 31.07 KG/M2 | WEIGHT: 182 LBS | RESPIRATION RATE: 16 BRPM | TEMPERATURE: 98 F

## 2020-01-02 DIAGNOSIS — R92.8 ABNORMAL FINDING ON BREAST IMAGING: ICD-10-CM

## 2020-01-02 DIAGNOSIS — Z12.31 VISIT FOR SCREENING MAMMOGRAM: ICD-10-CM

## 2020-01-02 DIAGNOSIS — N60.99 BREAST DUCTAL HYPERPLASIA, ATYPICAL: Primary | ICD-10-CM

## 2020-01-02 DIAGNOSIS — Z91.89 INCREASED RISK OF BREAST CANCER: ICD-10-CM

## 2020-01-02 DIAGNOSIS — Z80.0 FAMILY HISTORY OF PANCREATIC CANCER: ICD-10-CM

## 2020-01-02 PROCEDURE — 99214 OFFICE O/P EST MOD 30 MIN: CPT | Performed by: NURSE PRACTITIONER

## 2020-01-02 NOTE — PROGRESS NOTES
Surgical Oncology Follow Up       [address] [facility],[address]  [facility] [address]  [place]  [place]  1950  [id number]      Chief Complaint   Patient presents with    Follow-up       Assessment/Plan:  1  Breast ductal hyperplasia, atypical  - BUN; Future  - Creatinine, serum; Future  - MRI breast bilateral w and wo contrast w cad; Future    2  Abnormal finding on breast imaging  - bilateral diagnostic ultrasound ordered- to be scheduled today    3  Family history of pancreatic cancer  - patient would qualify for genetic testing since her paternal grandmother was diagnosed with pancreatic cancer  I reviewed the role of genetic testing with her today and she will call the office if she is interested in scheduling an appointment for testing in [facility] brochure given to patient    4  Visit for screening mammogram  - Mammo screening bilateral w 3d & cad; Future    5  Increased risk of breast cancer  - Alternating annual ABUS and MRI  - BUN; Future  - Creatinine, serum; Future  - MRI breast bilateral w and wo contrast w cad; Future       Discussion/Summary: Patient is a 60-year-old female who presents today for a one-year follow-up for increased risk of breast cancer and a personal history of atypical ductal hyperplasia  She had underwent a left breast lumpectomy in 2010 and an excision of fibroadenomas in 2012  Her Tyrer-Cuzick lifetime risk is 28 600% and her NCI lifetime risk is 22 1%  She had a bilateral 3D screening mammogram on April 24, 2019 which was BI-RADS 1, category 3 density  She had an automated breast ultrasound performed on December 30, 2019 which was BI-RADS 0  There are a few hypoechoic lesions noted in the bilateral breasts and a bilateral diagnostic ultrasound was recommended  We will schedule this for the patient today  There are no abnormal findings on her physical exam today    We will continue with annual 3D mammography and annual MRI staggered with annual ABUS  She will be seeing her gynecologist in approximately 6 months for another clinical exam and therefore will plan to see her back in 1 year assuming her upcoming ultrasound reveals no worrisome findings  We discussed her family history which includes pancreatic cancer in a paternal grandmother and also uterine cancer in a paternal aunt in her 46s  She would qualify for genetic testing  While in the office today, the patient was informed about benefits and implications of genetic testing such as potential discrimination against life or disability insurance   The patient understands that there are three possible outcomes of genetic testing: no pathogenic mutation, a positive pathogenic mutation and variant of uncertain significance  We discussed that if there is a mutation identified, this will guide us to make medical recommendations, such as prophylactic surgery or increased screening regimens  We also discussed that there is a 50% chance of a first degree relative to have the same mutation  She states she understands this and the pros and cons of testing and wishes to think about pursing genetic testing and she will call the office if she wants to schedule testing  All of her questions were answered today  History of Present Illness:     -Interval History: Patient presents today for an overdue f/u visit for an increased risk of breast cancer secondary to ADH  She had a bilateral 3D screening mammogram on April 24, 2019 which was BI-RADS 1, category 3 density  She had an automated breast ultrasound performed on December 30, 2019 which was BI-RADS 0  There are a few hypoechoic lesions noted in the bilateral breasts and a bilateral diagnostic ultrasound was recommended   She notices no changes on self exam     Review of Systems:  Review of Systems   Constitutional: Negative for activity change, appetite change, chills, fatigue, fever and unexpected weight change  HENT: Negative for trouble swallowing  Eyes: Negative for pain, redness and visual disturbance  Respiratory: Negative for cough, shortness of breath and wheezing  Cardiovascular: Negative for chest pain, palpitations and leg swelling  Gastrointestinal: Negative for abdominal pain, constipation, diarrhea, nausea and vomiting  Endocrine: Negative for cold intolerance and heat intolerance  Musculoskeletal: Negative for arthralgias, back pain, gait problem and myalgias  Skin: Negative for color change and rash  Neurological: Negative for dizziness, syncope, light-headedness, numbness and headaches  Hematological: Negative for adenopathy  Psychiatric/Behavioral: Negative for agitation and confusion  All other systems reviewed and are negative        Patient Active Problem List   Diagnosis    Anxiety    Arthritis    Breast ductal hyperplasia, atypical    Esophageal reflux    Headache, unspecified headache type    [facility] of skin ([facility] )    Vasovagal syncope    Abnormal finding on breast imaging     Past Medical History:   Diagnosis Date    Melanoma ([facility] ) 2014     Past Surgical History:   Procedure Laterality Date    BREAST CYST EXCISION Left     6 yrs ago     Family History   Problem Relation Age of Onset    Colon cancer Other 29     Social History     Socioeconomic History    Marital status: /Civil Union     Spouse name: Not on file    Number of children: Not on file    Years of education: Not on file    Highest education level: Not on file   Occupational History    Not on file   Social Needs    Financial resource strain: Not on file    Food insecurity:     Worry: Not on file     Inability: Not on file    Transportation needs:     Medical: Not on file     Non-medical: Not on file   Tobacco Use    Smoking status: Not on file   Substance and Sexual Activity    Alcohol use: Not on file    Drug use: Not on file    Sexual activity: Not on file   Lifestyle    Physical activity:     Days per week: Not on file     Minutes per session: Not on file    Stress: Not on file   Relationships    Social connections:     Talks on phone: Not on file     Gets together: Not on file     Attends Christianity service: Not on file     Active member of club or organization: Not on file     Attends meetings of clubs or organizations: Not on file     Relationship status: Not on file    Intimate partner violence:     Fear of current or ex partner: Not on file     Emotionally abused: Not on file     Physically abused: Not on file     Forced sexual activity: Not on file   Other Topics Concern    Not on file   Social History Narrative    Not on file       Current Outpatient Medications:     ibuprofen (MOTRIN) 200 mg tablet, Take 1 tablet by mouth 4 (four) times a day, Disp: , Rfl:     ALPRAZolam (XANAX) 0 5 mg tablet, 0 5 mg, Disp: , Rfl:     EVENING PRIMROSE OIL PO, Take by mouth, Disp: , Rfl:   Allergies   Allergen Reactions    Erythromycin Other (See Comments)     Other reaction(s): trouble breathing    Naproxen Abdominal Pain     Vitals:    01/02/20 0757   BP: 142/90   Pulse: 68   Resp: 16   Temp: 98 °F (36 7 °C)       Physical Exam   Constitutional: She is oriented to person, place, and time  Vital signs are normal  She appears well-developed and well-nourished  No distress  HENT:   Head: Normocephalic and atraumatic  Neck: Normal range of motion  Cardiovascular: Normal rate, regular rhythm and normal heart sounds  Pulmonary/Chest: Effort normal and breath sounds normal    Bilateral breasts were examined in the sitting and supine position  Left breast surgical scar  There are no masses, skin nodules, nipple changes or nipple discharge  There is no bilateral supraclavicular or axillary lymphadenopathy noted  Abdominal: Soft  Normal appearance  She exhibits no mass  There is no hepatosplenomegaly  There is no tenderness  Musculoskeletal: Normal range of motion  Lymphadenopathy:     She has no axillary adenopathy  Right: No supraclavicular adenopathy present  Left: No supraclavicular adenopathy present  Neurological: She is alert and oriented to person, place, and time  Skin: Skin is warm, dry and intact  No rash noted  She is not diaphoretic  Psychiatric: She has a normal mood and affect  Her speech is normal    Vitals reviewed  Results:    Imaging  Us Breast Screening Bilateral Complete (abus)    Result Date: 12/30/2019  Narrative: DIAGNOSIS: Dense breasts; Increased risk of breast cancer TECHNIQUE: Automated breast ultrasound images were obtained on the [employer] system  Imaging was obtained in standard projections including AP, lateral and medial for both breasts, inclusive of all four quadrants  COMPARISONS: Prior breast imaging dated: 04/24/2019, 12/27/2018, 04/19/2018, 12/04/2017, 03/03/2017, 05/21/2016, 12/29/2015, 12/02/2014, and 11/22/2013 RELEVANT HISTORY: Family Breast Cancer History: No known family history of breast cancer  Family Medical History: Family medical history includes colon cancer in other  Personal History: Hormone history includes birth control  Surgical history includes breast excisional biopsy  No known relevant medical history  RISK ASSESSMENT: 5 Year Tyrer-Cuzick: 2 61 % 10 Year Tyrer-Cuzick: 5 1 % Lifetime Tyrer-Cuzick: 9 64 % INDICATION: Lydia Lozoya is a 81 y o  female with dense breasts presenting for automated breast ultrasound screening  FINDINGS: There possible hypoechoic lesions right breast 09:00 o'clock 5 cm from the nipple, left breast 07:00 o'clock 2 cm from the nipple and left breast 02:00 o'clock 3 cm from the nipple  Impression:  1  Possible hypoechoic masses in both breast as above  Recommend bilateral diagnostic ultrasound  Automated breast ultrasound is an adjunct, not a replacement, for routine yearly screening mammography   ASSESSMENT/BI-RADS CATEGORY:  Overall: 0 - Incomplete: Needs Additional Imaging Evaluation RECOMMENDATION:      - Ultrasound at the current time for both breasts  Workstation ID: [id number]      I reviewed the above imaging data  Advance Care Planning/Advance Directives:  Discussed disease status and treatment goals with the patient

## 2020-01-06 ENCOUNTER — HOSPITAL ENCOUNTER (OUTPATIENT)
Dept: ULTRASOUND IMAGING | Facility: CLINIC | Age: 70
Discharge: HOME | End: 2020-01-06
Payer: COMMERCIAL

## 2020-01-06 VITALS — BODY MASS INDEX: 31.07 KG/M2 | WEIGHT: 182 LBS | HEIGHT: 64 IN

## 2020-01-06 DIAGNOSIS — R92.8 ABNORMAL ULTRASOUND OF BREAST: ICD-10-CM

## 2020-01-06 PROCEDURE — 76642 ULTRASOUND BREAST LIMITED: CPT

## 2020-03-25 ENCOUNTER — TELEPHONE (OUTPATIENT)
Dept: FAMILY MEDICINE CLINIC | Facility: CLINIC | Age: 70
End: 2020-03-25

## 2020-03-25 NOTE — TELEPHONE ENCOUNTER
Left message for pt to see if she is still a patient in this office  Also asked her to call to schedule a well visit

## 2020-07-01 ENCOUNTER — HOSPITAL ENCOUNTER (OUTPATIENT)
Dept: RADIOLOGY | Age: 70
Discharge: HOME | End: 2020-07-01
Payer: COMMERCIAL

## 2020-07-01 VITALS — HEIGHT: 64 IN | BODY MASS INDEX: 29.88 KG/M2 | WEIGHT: 175 LBS

## 2020-07-01 DIAGNOSIS — Z12.31 VISIT FOR SCREENING MAMMOGRAM: ICD-10-CM

## 2020-07-01 PROCEDURE — 77067 SCR MAMMO BI INCL CAD: CPT

## 2020-07-01 PROCEDURE — 77063 BREAST TOMOSYNTHESIS BI: CPT

## 2021-01-12 ENCOUNTER — OFFICE VISIT (OUTPATIENT)
Dept: SURGICAL ONCOLOGY | Facility: CLINIC | Age: 71
End: 2021-01-12
Payer: COMMERCIAL

## 2021-01-12 VITALS
TEMPERATURE: 97 F | SYSTOLIC BLOOD PRESSURE: 140 MMHG | RESPIRATION RATE: 16 BRPM | HEIGHT: 64 IN | BODY MASS INDEX: 30.73 KG/M2 | HEART RATE: 69 BPM | WEIGHT: 180 LBS | DIASTOLIC BLOOD PRESSURE: 90 MMHG

## 2021-01-12 DIAGNOSIS — Z91.89 INCREASED RISK OF BREAST CANCER: Primary | ICD-10-CM

## 2021-01-12 DIAGNOSIS — N64.4 BREAST PAIN, LEFT: ICD-10-CM

## 2021-01-12 DIAGNOSIS — Z12.39 ENCOUNTER FOR BREAST CANCER SCREENING OTHER THAN MAMMOGRAM: ICD-10-CM

## 2021-01-12 DIAGNOSIS — N60.99 BREAST DUCTAL HYPERPLASIA, ATYPICAL: ICD-10-CM

## 2021-01-12 DIAGNOSIS — R92.2 DENSE BREASTS: ICD-10-CM

## 2021-01-12 DIAGNOSIS — Z80.0 FAMILY HISTORY OF PANCREATIC CANCER: ICD-10-CM

## 2021-01-12 PROCEDURE — 99214 OFFICE O/P EST MOD 30 MIN: CPT | Performed by: NURSE PRACTITIONER

## 2021-01-12 RX ORDER — MELATONIN
1000 DAILY
COMMUNITY

## 2021-01-12 NOTE — PROGRESS NOTES
Surgical Oncology Follow Up       [address] [facility],[address]  [facility] [place]  [address] [place] [address]    Cesilia Francois  1950  [id number]      Chief Complaint   Patient presents with    Follow-up     Pt is here for 1 year follow up        Assessment/Plan:  1  Breast ductal hyperplasia, atypical    2  Increased risk of breast cancer    3  Dense breasts  - US breast screening bilateral complete (ABUS); Future    4  Encounter for breast cancer screening other than mammogram  - US breast screening bilateral complete (ABUS); Future    5  Family history of pancreatic cancer  - Ambulatory Referral to Oncology Genetics; Future    6  Breast pain, left  - Mammo diagnostic left w 3d & cad; Future  - US breast left limited (diagnostic); Future  - 3 mo f/u visit      Discussion/Summary: : Patient is a 77-year-old female who presents today for a one-year follow-up for increased risk of breast cancer and a personal history of atypical ductal hyperplasia  She had underwent a left breast lumpectomy in 2010 and an excision of fibroadenomas in 2012  Her Tyrer-Cuzick lifetime risk is 28 600% and her NCI lifetime risk is 22 1%  she had a bilateral 3D screening mammogram on July 1, 2020 which was BI-RADS 1, category 3 density  She did not have her MRI as she prefers to avoid coming into the hospital secondary to the coronavirus pandemic  She complains today of new left nipple pain  This has been ongoing for a few weeks  She does not appreciate any changes on her self breast exam nor do I appreciate any abnormalities on her exam in the office today  However, given her new symptoms and increased risk I will obtain diagnostic imaging of the left breast   I also instructed the patient to continue to monitor for any changes and contact us with any concerns   I have recommended NSAIDS to help with the pain as this may be r/t nerve pain as she describes the pain as sharp, shooting and is intermittent in nature  I will tentatively plan to see her back in 3 months to ensure resolution of her pain and to ensure a stable clinical breast exam   We also discussed obtaining an automated breast ultrasound at this time in lieu of an MRI and patient is agreeable to this as this is performed in the outpatient setting  She is also interested in a referral to oncology Genetics and I placed this order for her  She is in agreement with this plan  All of her questions were answered today  History of Present Illness:     -Interval History:  Patient presents today for a follow-up visit for an increased risk of breast cancer  She reports new left nipple pain which has been present for approximately 3 weeks  She describes it as a sharp pain and it seems to come and go  She feels the pain is more superficial  She notices no new breast lumps, skin changes, nipple changes or nipple discharge  She had a bilateral mammogram in July which was BI-RADS 1, category 3 density  She states that she elected not to proceed with her breast MRI as is trying to avoid going into the hospital during the [place] pandemic  She reports no changes in her family history and is still interested in genetic testing  Review of Systems:  Review of Systems   Constitutional: Negative for activity change, appetite change, chills, fatigue, fever and unexpected weight change  Respiratory: Negative for cough and shortness of breath  Cardiovascular: Negative for chest pain  Gastrointestinal: Negative for abdominal pain, constipation, diarrhea, nausea and vomiting  Musculoskeletal: Positive for arthralgias (knee pain)  Negative for back pain, gait problem and myalgias  Skin: Negative for color change and rash  Neurological: Negative for dizziness and headaches  Hematological: Negative for adenopathy  Psychiatric/Behavioral: Negative for agitation and confusion     All other systems reviewed and are negative        Patient Active Problem List   Diagnosis    Anxiety    Arthritis    Breast ductal hyperplasia, atypical    Esophageal reflux    Headache, unspecified headache type    [facility]anoma of skin ([facility] )    Vasovagal syncope    Abnormal finding on breast imaging    Family history of pancreatic cancer     Past Medical History:   Diagnosis Date    Melanoma ([facility] ) 2014     Past Surgical History:   Procedure Laterality Date    BREAST BIOPSY Right     2 sites    BREAST CYST EXCISION Left     6 yrs ago     Family History   Problem Relation Age of Onset    Colon cancer Other 29    No Known Problems Mother     No Known Problems Father     No Known Problems Sister     No Known Problems Maternal Grandmother     No Known Problems Maternal Grandfather     Pancreatic cancer Paternal Grandmother 76    No Known Problems Paternal Grandfather     No Known Problems Brother     No Known Problems Brother     No Known Problems Brother     No Known Problems Son     No Known Problems Son     Uterine cancer Paternal Aunt         52's    Breast cancer Cousin         66's     Social History     Socioeconomic History    Marital status: /Civil Union     Spouse name: Not on file    Number of children: Not on file    Years of education: Not on file    Highest education level: Not on file   Occupational History    Not on file   Social Needs    Financial resource strain: Not on file    Food insecurity     Worry: Not on file     Inability: Not on file   [employer] needs     Medical: Not on file     Non-medical: Not on file   Tobacco Use    Smoking status: Former Smoker     Types: Cigarettes    Smokeless tobacco: Never Used   Substance and Sexual Activity    Alcohol use: Not on file    Drug use: Not on file    Sexual activity: Not on file   Lifestyle    Physical activity     Days per week: Not on file     Minutes per session: Not on file    Stress: Not on file   Relationships    Social connections     Talks on phone: Not on file     Gets together: Not on file     Attends Yazidism service: Not on file     Active member of club or organization: Not on file     Attends meetings of clubs or organizations: Not on file     Relationship status: Not on file    Intimate partner violence     Fear of current or ex partner: Not on file     Emotionally abused: Not on file     Physically abused: Not on file     Forced sexual activity: Not on file   Other Topics Concern    Not on file   Social History Narrative    Not on file       Current Outpatient Medications:     cholecalciferol (VITAMIN D3) 1,000 units tablet, Take 1,000 Units by mouth daily, Disp: , Rfl:     EVENING PRIMROSE OIL PO, Take by mouth, Disp: , Rfl:     ibuprofen (MOTRIN) 200 mg tablet, Take 1 tablet by mouth 4 (four) times a day, Disp: , Rfl:     ALPRAZolam (XANAX) 0 5 mg tablet, 0 5 mg, Disp: , Rfl:   Allergies   Allergen Reactions    Erythromycin Other (See Comments)     Other reaction(s): trouble breathing    Naproxen Abdominal Pain    Nickel Itching     Vitals:    01/12/21 0806   BP: 140/90   Pulse: 69   Resp: 16   Temp: (!) 97 °F (36 1 °C)       Physical Exam  Vitals signs reviewed  Constitutional:       Appearance: Normal appearance  HENT:      Head: Normocephalic and atraumatic  Pulmonary:      Effort: Pulmonary effort is normal    Chest:      Breasts:         Right: No swelling, bleeding, inverted nipple, mass, nipple discharge, skin change or tenderness  Left: Skin change (surgical scar) present  No swelling, bleeding, inverted nipple, mass, nipple discharge or tenderness  Comments: Left nipple with no skin changes, no discharge, no retraction/inversion  No masses in the nipple itself or the retro areolar region  Lymphadenopathy:      Upper Body:      Right upper body: No supraclavicular or axillary adenopathy  Left upper body: No supraclavicular or axillary adenopathy     Neurological:      General: No focal deficit present  Mental Status: She is alert and oriented to person, place, and time  Psychiatric:         Mood and Affect: Mood normal          Behavior: Behavior normal          Thought Content: Thought content normal          Judgment: Judgment normal            Results:    Imaging  7/1/2020- Bilat 3d screening mammogram- BIRADS 1, category 3 density      Advance Care Planning/Advance Directives:  Discussed disease status and treatment goals with the patient

## 2021-01-13 ENCOUNTER — TELEPHONE (OUTPATIENT)
Dept: HEMATOLOGY ONCOLOGY | Facility: CLINIC | Age: 71
End: 2021-01-13

## 2021-01-13 NOTE — TELEPHONE ENCOUNTER
Genetics New Patient Intake Form    Patient Details:      Des Garcia     1950     [id number]     Background Information:         Who is calling to schedule?                                            self    If not self, relationship to patient? Referring Provider Orpha Feeler    Is the referral marked STAT No    Is patient newly diagnosed with cancer, have metastatic disease, or pending surgery? No    If yes, which is it? If the patient is pending surgery Needs to be scheduled within 48 hours  If none available, schedule patient then email Stat cancer genetics    If the patient is metastatic or newly diagnosed Needs to be scheduled within 2 weeks  If none available, schedule patient then email Stat cancer genetics    Have you had genetic testing that showed a positive genetic mutation? (If yes, schedule within 2 weeks or email STAT cancer genetics) No    Has your family member had genetic testing that resulted in a positive genetic mutation? (If yes in the last 6 months, schedule within 3 weeks )  (If yes but over 6 months, schedule as usual) No    Is this a personal or family history? personal and family    What is the type of tumor? Personal - melanoma in situ  Family - colon, pancreatic    Scheduling Information:    Preferred [place]:  Any         Are there any dates/time the patient cannot be seen? No    Did the patient schedule an appointment?  Yes    If yes, list appointment date and provider name 3/30/21 [place]    If no, briefly state why     Miscellaneous: pt prefers a televisit    After completing the above information, please route to Oncology Genetics

## 2021-01-27 ENCOUNTER — HOSPITAL ENCOUNTER (OUTPATIENT)
Dept: ULTRASOUND IMAGING | Facility: CLINIC | Age: 71
Discharge: HOME | End: 2021-01-27
Payer: COMMERCIAL

## 2021-01-27 ENCOUNTER — HOSPITAL ENCOUNTER (OUTPATIENT)
Dept: MAMMOGRAPHY | Facility: CLINIC | Age: 71
Discharge: HOME | End: 2021-01-27
Payer: COMMERCIAL

## 2021-01-27 VITALS — HEIGHT: 64 IN | BODY MASS INDEX: 30.73 KG/M2 | WEIGHT: 180 LBS

## 2021-01-27 DIAGNOSIS — Z12.39 ENCOUNTER FOR BREAST CANCER SCREENING OTHER THAN MAMMOGRAM: ICD-10-CM

## 2021-01-27 DIAGNOSIS — N64.4 BREAST PAIN, LEFT: ICD-10-CM

## 2021-01-27 DIAGNOSIS — R92.2 DENSE BREASTS: ICD-10-CM

## 2021-01-27 PROCEDURE — 76642 ULTRASOUND BREAST LIMITED: CPT

## 2021-01-27 PROCEDURE — G0279 TOMOSYNTHESIS, MAMMO: HCPCS

## 2021-01-27 PROCEDURE — 76377 3D RENDER W/INTRP POSTPROCES: CPT

## 2021-01-27 PROCEDURE — 76641 ULTRASOUND BREAST COMPLETE: CPT

## 2021-01-27 PROCEDURE — 77065 DX MAMMO INCL CAD UNI: CPT

## 2021-03-10 DIAGNOSIS — Z23 ENCOUNTER FOR IMMUNIZATION: ICD-10-CM

## 2021-03-30 ENCOUNTER — CLINICAL SUPPORT (OUTPATIENT)
Dept: GENETICS | Facility: CLINIC | Age: 71
End: 2021-03-30

## 2021-03-30 DIAGNOSIS — Z80.49 FAMILY HISTORY OF UTERINE CANCER: ICD-10-CM

## 2021-03-30 DIAGNOSIS — C43.9 MELANOMA OF SKIN (HCC): ICD-10-CM

## 2021-03-30 DIAGNOSIS — Z80.0 FAMILY HISTORY OF PANCREATIC CANCER: Primary | ICD-10-CM

## 2021-03-30 PROCEDURE — NC001 PR NO CHARGE: Performed by: GENETIC COUNSELOR, MS

## 2021-03-30 NOTE — PROGRESS NOTES
Pre-Test Genetic Counseling Consult Note    Patient Name: Sandra Massey   /Age: 1950/71 y o  Referring Provider: YOLI Solis     Date of Service: 3/30/2021  Genetic Counselor: Gladys Milton MS, [facility]  Interpretation Services: None  Location: Telephone consult   Length of Visit: 61 minutes      lEvia White was referred to the [address] [place] [address] and Genetic Assessment Program due to her personal history of melanoma and family history of pancreatic, colon and uterine cancer  She presents today to discuss the possibility of a hereditary cancer syndrome, options for genetic testing, and implications for her and her family  Cancer History and Treatment:     Personal History: Personal history of melanoma in situ at age 60    Screening Hx:     Breast:   Mammogram: Routine   Also has an annual MRI and/or ultrasound   Breast biopsy: Yes; 2 biopsies     Personal history of atypical ductal hyperplasia  She had underwent a left breast lumpectomy in  and an excision of fibroadenomas in   Her Tyrer-Cuzick lifetime risk is 28 600% and her NCI lifetime risk is 22 1%        Colon:  Colonoscopy: One colonoscopy approximately 6 years ago  Less than 5 polyps reported    Gynecologic:  Pelvic/Pap exam: Not routinely   Ovaries/Uterus: Intact    Skin:  Skin cancer screening: Annual with dermatologist     Reproductive History  Age at menarche: 10y  Age at first live birth: 34y  Menopause: 43y  Hormone replacement: None     Medical and Surgical History  Pertinent surgical history:   Past Surgical History:   Procedure Laterality Date    BREAST BIOPSY Right     2 sites    BREAST CYST EXCISION Left     6 yrs ago      Pertinent medical history:  Past Medical History:   Diagnosis Date    Melanoma ([facility] [address] )          Other History:  Height:   Ht Readings from Last 1 Encounters:   21 5' 4" (1 626 m)     Weight:   Wt Readings from Last 1 Encounters:   21 81 6 kg (180 lb)     Relevant Family History Patient reports no Ashkenazi Episcopal ancestry      - Nephew:  age 29 with mucinous adenocarcinoma of the colon; he reportedly underwent a genetic work-up and was negative; records were not available at the time of our appointment    - Mother:  age 80 with no history of cancer; there is no known history of cancer in maternal relatives    - Father:  age [de-identified] with no history of cancer  - Paternal Aunt:  with uterine cancer in her late 42's  - Paternal Grandmother:  with pancreatic cancer at age 68    Please refer to the scanned pedigree in the Media Tab for a complete family history     *All history is reported as provided by the patient; records are not available for review, except where indicated  Assessment:  We discussed sporadic, familial and hereditary cancer  We also discussed the many factors that influence our risk for cancer such as age, environmental exposures, lifestyle choices and family history  We reviewed the indications suggestive of a hereditary predisposition to cancer  Genetic testing is indicated for Stacie Omer based on the following criteria:     Meets NCCN V2 2021 Testing Criteria for High-Penetrance Breast and/or Ovarian Cancer Susceptibility Genes based on her paternal grandmother's (second-degree relative) diagnosis of pancreatic cancer  Meets NCCN V1 2020 Testing Criteria for the Evaluation of Bravo syndrome based on having 3 second-degree relatives (nephew, paterna aunt and paternal grandmother) with a Bravo-syndrome related cancer, two of whom were diagnosed under the age of 48  The risks, benefits, and limitations of genetic testing were reviewed with the patient, as well as genetic discrimination laws, and possible test results (positive, negative, variants of uncertain significance) and their clinical implications   If positive for a mutation, options for managing cancer risk including increased surveillance, chemoprevention, and in some cases prophylactic surgery were discussed  Sepideh Brice was informed that if a hereditary cancer syndrome was identified in her, first degree relatives (parents, siblings, and children) have a chance of also inheriting the condition  Genetic testing would allow for predictive genetic testing in other relatives, who may also be at risk depending on their degree of relation  Plan: Patient decided not to proceed with testing at this time  Sepideh Brice would like to discuss this information with her family prior to proceeding  We emailed Sepideh Brice our contact information, a hereditary cancer brochure, a brochure on [facility]'s billing policy and information on the [place] law  Eddchelita Yuniel can reach out to our office in the future if/when she is ready to proceed with testing and we will mail her a collection kit

## 2021-03-30 NOTE — LETTER
2021     [address] [facility] [address]    Patient: Mina Escamilla  YOB: 1950  Date of Visit: 3/30/2021      Dear Dr Giovanna Kanner: Thank you for referring Devinpatricia Villavicencio to me for evaluation  Below are my notes for this consultation  If you have questions, please do not hesitate to call me  I look forward to following your patient along with you  Sincerely,        Inez Amin GC        CC: No Recipients        Pre-Test Genetic Counseling Consult Note    Patient Name: Mina Escamilla   /Age: 1950/71 y o  Referring Provider: YOLI Aldrich     Date of Service: 3/30/2021  Genetic Counselor: Jose Alejandro Waters MS, [facility]  Interpretation Services: None  Location: Telephone consult   Length of Visit: 61 minutes      Radha Souza was referred to the [address] [place] [address] and Genetic Assessment Program due to her personal history of melanoma and family history of pancreatic, colon and uterine cancer  She presents today to discuss the possibility of a hereditary cancer syndrome, options for genetic testing, and implications for her and her family  Cancer History and Treatment:     Personal History: Personal history of melanoma in situ at age 60    Screening Hx:     Breast:   Mammogram: Routine   Also has an annual MRI and/or ultrasound   Breast biopsy: Yes; 2 biopsies     Personal history of atypical ductal hyperplasia  She had underwent a left breast lumpectomy in  and an excision of fibroadenomas in   Her Tyrer-Cuzick lifetime risk is 28 600% and her NCI lifetime risk is 22 1%        Colon:  Colonoscopy: One colonoscopy approximately 6 years ago  Less than 5 polyps reported    Gynecologic:  Pelvic/Pap exam: Not routinely   Ovaries/Uterus: Intact    Skin:  Skin cancer screening: Annual with dermatologist     Reproductive History  Age at menarche: 10y  Age at first live birth: 34y  Menopause: 43y  Hormone replacement: None Medical and Surgical History  Pertinent surgical history:   Past Surgical History:   Procedure Laterality Date    BREAST BIOPSY Right     2 sites    BREAST CYST EXCISION Left     6 yrs ago      Pertinent medical history:  Past Medical History:   Diagnosis Date    Melanoma ([address] )          Other History:  Height:   Ht Readings from Last 1 Encounters:   21 5' 4" (1 626 m)     Weight:   Wt Readings from Last 1 Encounters:   21 81 6 kg (180 lb)     Relevant Family History   Patient reports no Ashkenazi Jehovah's witness ancestry      - Nephew:  age 29 with mucinous adenocarcinoma of the colon; he reportedly underwent a genetic work-up and was negative; records were not available at the time of our appointment    - Mother:  age 80 with no history of cancer; there is no known history of cancer in maternal relatives    - Father:  age [de-identified] with no history of cancer  - Paternal Aunt:  with uterine cancer in her late 42's  - Paternal Grandmother:  with pancreatic cancer at age 68    Please refer to the scanned pedigree in the Media Tab for a complete family history     *All history is reported as provided by the patient; records are not available for review, except where indicated  Assessment:  We discussed sporadic, familial and hereditary cancer  We also discussed the many factors that influence our risk for cancer such as age, environmental exposures, lifestyle choices and family history  We reviewed the indications suggestive of a hereditary predisposition to cancer  Genetic testing is indicated for Joie Guzmán based on the following criteria:     Meets NCCN V2 2021 Testing Criteria for High-Penetrance Breast and/or Ovarian Cancer Susceptibility Genes based on her paternal grandmother's (second-degree relative) diagnosis of pancreatic cancer      Meets NCCN V1 2020 Testing Criteria for the Evaluation of Bravo syndrome based on having 3 second-degree relatives (nephew, paterna aunt and paternal grandmother) with a Bravo-syndrome related cancer, two of whom were diagnosed under the age of 48  The risks, benefits, and limitations of genetic testing were reviewed with the patient, as well as genetic discrimination laws, and possible test results (positive, negative, variants of uncertain significance) and their clinical implications  If positive for a mutation, options for managing cancer risk including increased surveillance, chemoprevention, and in some cases prophylactic surgery were discussed  Abdirashid Mace was informed that if a hereditary cancer syndrome was identified in her, first degree relatives (parents, siblings, and children) have a chance of also inheriting the condition  Genetic testing would allow for predictive genetic testing in other relatives, who may also be at risk depending on their degree of relation  Plan: Patient decided not to proceed with testing at this time  Abdirashiddimitrios Mace would like to discuss this information with her family prior to proceeding  We emailed Abdirashid Mace our contact information, a hereditary cancer brochure, a brochure on [facility]'s billing policy and information on the [place] law  Abdirashid Mace can reach out to our office in the future if/when she is ready to proceed with testing and we will mail her a collection kit

## 2021-04-15 ENCOUNTER — OFFICE VISIT (OUTPATIENT)
Dept: SURGICAL ONCOLOGY | Facility: CLINIC | Age: 71
End: 2021-04-15
Payer: COMMERCIAL

## 2021-04-15 VITALS
HEIGHT: 64 IN | SYSTOLIC BLOOD PRESSURE: 120 MMHG | DIASTOLIC BLOOD PRESSURE: 80 MMHG | WEIGHT: 176 LBS | BODY MASS INDEX: 30.05 KG/M2 | RESPIRATION RATE: 16 BRPM | HEART RATE: 71 BPM | TEMPERATURE: 97 F

## 2021-04-15 DIAGNOSIS — N60.99 BREAST DUCTAL HYPERPLASIA, ATYPICAL: ICD-10-CM

## 2021-04-15 DIAGNOSIS — Z12.31 VISIT FOR SCREENING MAMMOGRAM: ICD-10-CM

## 2021-04-15 DIAGNOSIS — Z91.89 INCREASED RISK OF BREAST CANCER: Primary | ICD-10-CM

## 2021-04-15 PROCEDURE — 99213 OFFICE O/P EST LOW 20 MIN: CPT | Performed by: NURSE PRACTITIONER

## 2021-04-15 NOTE — PROGRESS NOTES
Surgical Oncology Follow Up       [address]  [facility]  [address] [place] [address]    Lilliam Frederickrad  1950  [id number]      Chief Complaint   Patient presents with    Follow-up     Pt is here for 3 month f/u       Assessment/Plan:  1  Increased risk of breast cancer  - MRI breast bilateral w and wo contrast w cad; Future  - 1 year f/u visit    2  Breast ductal hyperplasia, atypical    3  Visit for screening mammogram  - Mammo screening bilateral w 3d & cad; Future      Discussion/Summary: Patient is a 53-year-old female who presents today for a one-year follow-up for increased risk of breast cancer and a personal history of atypical ductal hyperplasia  She had underwent a left breast lumpectomy in 2010 and an excision of fibroadenomas in 2012  Her Tyrer-Cuzick lifetime risk is 28 600% and her NCI lifetime risk is 22 1%  She was having left nipple pain and had a bilateral 3D screening mammogram on July 1, 2020 which was BI-RADS 1, category 3 density  She had a left diagnostic mammogram and ultrasound in January of 2021 which was BI-RADS 2  There was a 6 mm simple cyst in the retroareolar region  She also had an automated breast ultrasound which was BI-RADS 1  She states her left breast/nipple pain has resolved and she notices no changes on her self breast exam   There are no concerns on my exam today  I will order a bilateral mammogram for July  In regards to her increased risk, we discussed continuing with automated breast ultrasound versus alternating breast MRI with ABUS every other year  Patient is agreeable to have an MRI in January of 2022  I will see her back in 1 year as she will be receiving a clinical breast exam with her gynecologist later this year  She was instructed to call with any new concerns or symptoms prior to that time  All of her questions were answered today        History of Present Illness:     -Interval History:  Patient with oncology Genetics but elected not to undergo testing at this time but would consider in the future  She had an ABUS and left diagnostic mammogram and ultrasound on July 27, 2021  There is a simple cyst in the left retroareolar region  Her left nipple pain has resolved  She notices no changes on her self breast exam     Review of Systems:  Review of Systems   Constitutional: Negative for activity change, appetite change, chills, fatigue, fever and unexpected weight change  Respiratory: Negative for cough and shortness of breath  Cardiovascular: Negative for chest pain  Skin: Negative for color change and rash  Hematological: Negative for adenopathy  Psychiatric/Behavioral: Negative for agitation and confusion  All other systems reviewed and are negative        Patient Active Problem List   Diagnosis    Anxiety    Arthritis    Breast ductal hyperplasia, atypical    Esophageal reflux    Headache, unspecified headache type    Melanoma of skin ([facility] )    Vasovagal syncope    Abnormal finding on breast imaging    Family history of pancreatic cancer    Increased risk of breast cancer    Breast pain, left     Past Medical History:   Diagnosis Date    Melanoma ([facility] ) 2014     Past Surgical History:   Procedure Laterality Date    BREAST BIOPSY Right     2 sites    BREAST CYST EXCISION Left     6 yrs ago     Family History   Problem Relation Age of Onset    Colon cancer Other 29    No Known Problems Mother     No Known Problems Father     No Known Problems Sister     No Known Problems Maternal Grandmother     No Known Problems Maternal Grandfather     Pancreatic cancer Paternal Grandmother 76    No Known Problems Paternal Grandfather     No Known Problems Brother     No Known Problems Brother     No Known Problems Brother     No Known Problems Son     No Known Problems Son     Uterine cancer Paternal Aunt         52's    Breast cancer Cousin         70's Social History     Socioeconomic History    Marital status: /Civil Union     Spouse name: Not on file    Number of children: Not on file    Years of education: Not on file    Highest education level: Not on file   Occupational History    Not on file   Social Needs    Financial resource strain: Not on file    Food insecurity     Worry: Not on file     Inability: Not on file   [employer] needs     Medical: Not on file     Non-medical: Not on file   Tobacco Use    Smoking status: Former Smoker     Types: Cigarettes    Smokeless tobacco: Never Used   Substance and Sexual Activity    Alcohol use: Not on file    Drug use: Not on file    Sexual activity: Not on file   Lifestyle    Physical activity     Days per week: Not on file     Minutes per session: Not on file    Stress: Not on file   Relationships    Social connections     Talks on phone: Not on file     Gets together: Not on file     Attends Confucianism service: Not on file     Active member of club or organization: Not on file     Attends meetings of clubs or organizations: Not on file     Relationship status: Not on file    Intimate partner violence     Fear of current or ex partner: Not on file     Emotionally abused: Not on file     Physically abused: Not on file     Forced sexual activity: Not on file   Other Topics Concern    Not on file   Social History Narrative    Not on file       Current Outpatient Medications:     cholecalciferol (VITAMIN D3) 1,000 units tablet, Take 1,000 Units by mouth daily, Disp: , Rfl:     ALPRAZolam (XANAX) 0 5 mg tablet, 0 5 mg, Disp: , Rfl:   Allergies   Allergen Reactions    Erythromycin Other (See Comments)     Other reaction(s): trouble breathing    Naproxen Abdominal Pain    Nickel Itching     Vitals:    04/15/21 1127   BP: 120/80   Pulse: 71   Resp: 16   Temp: (!) 97 °F (36 1 °C)       Physical Exam  Constitutional:       Appearance: Normal appearance     HENT:      Head: Normocephalic and atraumatic  Pulmonary:      Effort: Pulmonary effort is normal    Chest:      Breasts:         Right: No swelling, bleeding, inverted nipple, mass, nipple discharge, skin change or tenderness  Left: Skin change (surgical scar) present  No swelling, bleeding, inverted nipple, mass, nipple discharge or tenderness  Lymphadenopathy:      Upper Body:      Right upper body: No supraclavicular or axillary adenopathy  Left upper body: No supraclavicular or axillary adenopathy  Neurological:      General: No focal deficit present  Mental Status: She is alert and oriented to person, place, and time  Psychiatric:         Mood and Affect: Mood normal            Advance Care Planning/Advance Directives:  Discussed disease status and treatment goals with the patient

## 2021-08-17 ENCOUNTER — OFFICE VISIT (OUTPATIENT)
Dept: FAMILY MEDICINE CLINIC | Facility: CLINIC | Age: 71
End: 2021-08-17
Payer: COMMERCIAL

## 2021-08-17 VITALS
WEIGHT: 171.8 LBS | TEMPERATURE: 97.6 F | BODY MASS INDEX: 29.33 KG/M2 | HEIGHT: 64 IN | SYSTOLIC BLOOD PRESSURE: 130 MMHG | DIASTOLIC BLOOD PRESSURE: 80 MMHG

## 2021-08-17 DIAGNOSIS — Z00.00 MEDICARE ANNUAL WELLNESS VISIT, SUBSEQUENT: Primary | ICD-10-CM

## 2021-08-17 DIAGNOSIS — Z12.11 ENCOUNTER FOR COLORECTAL CANCER SCREENING: ICD-10-CM

## 2021-08-17 DIAGNOSIS — Z12.12 ENCOUNTER FOR COLORECTAL CANCER SCREENING: ICD-10-CM

## 2021-08-17 DIAGNOSIS — F41.9 ANXIETY: ICD-10-CM

## 2021-08-17 DIAGNOSIS — R23.8 EASY BRUISING: ICD-10-CM

## 2021-08-17 DIAGNOSIS — Z00.00 WELL ADULT EXAM: ICD-10-CM

## 2021-08-17 DIAGNOSIS — Z11.59 NEED FOR HEPATITIS C SCREENING TEST: ICD-10-CM

## 2021-08-17 DIAGNOSIS — Z23 ENCOUNTER FOR IMMUNIZATION: ICD-10-CM

## 2021-08-17 DIAGNOSIS — C43.9 MELANOMA OF SKIN (HCC): ICD-10-CM

## 2021-08-17 DIAGNOSIS — S91.111A LACERATION OF RIGHT GREAT TOE WITHOUT FOREIGN BODY PRESENT OR DAMAGE TO NAIL, INITIAL ENCOUNTER: ICD-10-CM

## 2021-08-17 DIAGNOSIS — M19.90 ARTHRITIS: ICD-10-CM

## 2021-08-17 PROCEDURE — 1160F RVW MEDS BY RX/DR IN RCRD: CPT | Performed by: FAMILY MEDICINE

## 2021-08-17 PROCEDURE — 1036F TOBACCO NON-USER: CPT | Performed by: FAMILY MEDICINE

## 2021-08-17 PROCEDURE — 1125F AMNT PAIN NOTED PAIN PRSNT: CPT | Performed by: FAMILY MEDICINE

## 2021-08-17 PROCEDURE — 99214 OFFICE O/P EST MOD 30 MIN: CPT | Performed by: FAMILY MEDICINE

## 2021-08-17 PROCEDURE — 1101F PT FALLS ASSESS-DOCD LE1/YR: CPT | Performed by: FAMILY MEDICINE

## 2021-08-17 PROCEDURE — G0439 PPPS, SUBSEQ VISIT: HCPCS | Performed by: FAMILY MEDICINE

## 2021-08-17 PROCEDURE — 3725F SCREEN DEPRESSION PERFORMED: CPT | Performed by: FAMILY MEDICINE

## 2021-08-17 PROCEDURE — 3008F BODY MASS INDEX DOCD: CPT | Performed by: FAMILY MEDICINE

## 2021-08-17 PROCEDURE — 3288F FALL RISK ASSESSMENT DOCD: CPT | Performed by: FAMILY MEDICINE

## 2021-08-17 PROCEDURE — 90471 IMMUNIZATION ADMIN: CPT

## 2021-08-17 PROCEDURE — 1170F FXNL STATUS ASSESSED: CPT | Performed by: FAMILY MEDICINE

## 2021-08-17 PROCEDURE — 90715 TDAP VACCINE 7 YRS/> IM: CPT

## 2021-08-17 RX ORDER — MELOXICAM 15 MG/1
15 TABLET ORAL DAILY
Qty: 30 TABLET | Refills: 5 | Status: SHIPPED | OUTPATIENT
Start: 2021-08-17 | End: 2022-02-09 | Stop reason: SDUPTHER

## 2021-08-17 RX ORDER — ALPRAZOLAM 0.5 MG/1
0.5 TABLET ORAL
Qty: 30 TABLET | Refills: 0 | Status: SHIPPED | OUTPATIENT
Start: 2021-08-17 | End: 2022-04-04 | Stop reason: SDUPTHER

## 2021-08-17 NOTE — PROGRESS NOTES
Assessment and Plan:     Problem List Items Addressed This Visit     None      Visit Diagnoses     Encounter for colorectal cancer screening    -  Primary    Relevant Orders    Cologuard        BMI Counseling: Body mass index is 29 49 kg/m²  The BMI is above normal  Nutrition recommendations include decreasing portion sizes, encouraging healthy choices of fruits and vegetables, decreasing fast food intake, consuming healthier snacks, limiting drinks that contain sugar, moderation in carbohydrate intake, increasing intake of lean protein, reducing intake of saturated and trans fat and reducing intake of cholesterol  Exercise recommendations include moderate physical activity 150 minutes/week  No pharmacotherapy was ordered  Preventive health issues were discussed with patient, and age appropriate screening tests were ordered as noted in patient's After Visit Summary  Personalized health advice and appropriate referrals for health education or preventive services given if needed, as noted in patient's After Visit Summary       History of Present Illness:     Patient presents for Medicare Annual Wellness visit    Patient Care Team:  MD Uma Restrepo CRNP (Inactive)  Zia Quinteros MD     Problem List:     Patient Active Problem List   Diagnosis    Anxiety    Arthritis    Breast ductal hyperplasia, atypical    Esophageal reflux    Headache, unspecified headache type    [facility]oma of skin ([facility] )    Vasovagal syncope    Abnormal finding on breast imaging    Family history of pancreatic cancer    Increased risk of breast cancer    Breast pain, left      Past Medical and Surgical History:     Past Medical History:   Diagnosis Date    [facility] ([facility] ) 2014     Past Surgical History:   Procedure Laterality Date    BREAST BIOPSY Right     2 sites    BREAST CYST EXCISION Left     6 yrs ago      Family History:     Family History   Problem Relation Age of Onset    Colon cancer Other 29    No Known Problems Mother     No Known Problems Father     No Known Problems Sister     No Known Problems Maternal Grandmother     No Known Problems Maternal Grandfather     Pancreatic cancer Paternal Grandmother 76    No Known Problems Paternal Grandfather     No Known Problems Brother     No Known Problems Brother     No Known Problems Brother     No Known Problems Son     No Known Problems Son     Uterine cancer Paternal Aunt         52's    Breast cancer Cousin         66's      Social History:     Social History     Socioeconomic History    Marital status: /Civil Union     Spouse name: None    Number of children: None    Years of education: None    Highest education level: None   Occupational History    None   Tobacco Use    Smoking status: Former Smoker     Types: Cigarettes    Smokeless tobacco: Never Used   Substance and Sexual Activity    Alcohol use: None    Drug use: None    Sexual activity: None   Other Topics Concern    None   Social History Narrative    None     Social Determinants of Health     Financial Resource Strain:     Difficulty of Paying Living Expenses:    Food Insecurity:     Worried About Running Out of Food in the Last Year:     Ran Out of Food in the Last Year:    Transportation Needs:     Lack of Transportation (Medical):      Lack of Transportation (Non-Medical):    Physical Activity:     Days of Exercise per Week:     Minutes of Exercise per Session:    Stress:     Feeling of Stress :    Social Connections:     Frequency of Communication with Friends and Family:     Frequency of Social Gatherings with Friends and Family:     Attends Religion Services:     Active Member of Clubs or Organizations:     Attends Club or Organization Meetings:     Marital Status:    Intimate Partner Violence:     Fear of Current or Ex-Partner:     Emotionally Abused:     Physically Abused:     Sexually Abused:       Medications and Allergies:     Current Outpatient Medications   Medication Sig Dispense Refill    cholecalciferol (VITAMIN D3) 1,000 units tablet Take 1,000 Units by mouth daily       No current facility-administered medications for this visit  Allergies   Allergen Reactions    Erythromycin Other (See Comments)     Other reaction(s): trouble breathing    Naproxen Abdominal Pain    Nickel Itching      Immunizations:     Immunization History   Administered Date(s) Administered    INFLUENZA 11/08/2020    SARS-CoV-2 / COVID-19 mRNA IM ([facility]) 02/03/2021, 03/03/2021    TD (adult) Preservative Free 01/01/2014    Td (adult), adsorbed 01/01/2014      Health Maintenance:         Topic Date Due    Hepatitis C Screening  Never done    Colorectal Cancer Screening  Never done    Breast Cancer Screening: Mammogram  01/27/2022         Topic Date Due    DTaP,Tdap,and Td Vaccines (1 - Tdap) 02/22/1973    Pneumococcal Vaccine: 65+ Years (1 of 1 - PPSV23) Never done    Influenza Vaccine (1) 09/01/2021      Medicare Health Risk Assessment:     /80   Temp 97 6 °F (36 4 °C)   Ht 5' 4" (1 626 m)   Wt 77 9 kg (171 lb 12 8 oz)   LMP  (LMP Unknown)   BMI 29 49 kg/m²      Juan Escobar is here for her Subsequent Wellness visit  Health Risk Assessment:   Patient rates overall health as very good  Patient feels that their physical health rating is same  Patient is very satisfied with their life  Eyesight was rated as same  Hearing was rated as same  Patient feels that their emotional and mental health rating is same  Patients states they are never, rarely angry  Patient states they are never, rarely unusually tired/fatigued  Pain experienced in the last 7 days has been none  Patient states that she has experienced no weight loss or gain in last 6 months  Depression Screening:   PHQ-2 Score: 0      Fall Risk Screening:    In the past year, patient has experienced: no history of falling in past year      Urinary Incontinence Screening:   Patient has not leaked urine accidently in the last six months  Home Safety:  Patient has trouble with stairs inside or outside of their home  Patient has working smoke alarms and has working carbon monoxide detector  Home safety hazards include: none  Nutrition:   Current diet is Regular  Medications:   Patient is currently taking over-the-counter supplements  OTC medications include: see medication list  Patient is able to manage medications  Activities of Daily Living (ADLs)/Instrumental Activities of Daily Living (IADLs):   Walk and transfer into and out of bed and chair?: Yes  Dress and groom yourself?: Yes    Bathe or shower yourself?: Yes    Feed yourself? Yes  Do your laundry/housekeeping?: Yes  Manage your money, pay your bills and track your expenses?: Yes  Make your own meals?: Yes    Do your own shopping?: Yes    Previous Hospitalizations:   Any hospitalizations or ED visits within the last 12 months?: No      Advance Care Planning:   Living will: No    Durable POA for healthcare:  Yes    Advanced directive: No      PREVENTIVE SCREENINGS      Cardiovascular Screening:    General: Screening Current      Diabetes Screening:     General: Screening Not Indicated, History Diabetes and Risks and Benefits Discussed      Colorectal Cancer Screening:     General: Screening Current      Breast Cancer Screening:     General: Screening Current      Cervical Cancer Screening:    General: Screening Not Indicated      Osteoporosis Screening:    General: Risks and Benefits Discussed      Abdominal Aortic Aneurysm (AAA) Screening:        General: Risks and Benefits Discussed      Lung Cancer Screening:     General: Screening Not Indicated      Hepatitis C Screening:    General: Risks and Benefits Discussed    Screening, Brief Intervention, and Referral to Treatment (SBIRT)    Screening      Single Item Drug Screening:  How often have you used an illegal drug (including marijuana) or a prescription medication for non-medical reasons in the past year? never    Single Item Drug Screen Score: 0  Interpretation: Negative screen for possible drug use disorder      Ely Dates, DO

## 2021-08-17 NOTE — PROGRESS NOTES
Assessment/Plan:    She is doing well  I will call with the lab results  Follow-up here in 1 year as needed  Diagnoses and all orders for this visit:    Medicare annual wellness visit, subsequent  -     CBC and differential  -     Lipid panel  -     Comprehensive metabolic panel  -     TSH, 3rd generation  -     Vitamin D 25 hydroxy  -     Prothrombin w/INR + Partial Thromboplastin Times; Future    Encounter for colorectal cancer screening  -     Cologuard    Arthritis    Anxiety  -     ALPRAZolam (XANAX) 0 5 mg tablet; Take 1 tablet (0 5 mg total) by mouth daily at bedtime as needed for anxiety  -     meloxicam (MOBIC) 15 mg tablet; Take 1 tablet (15 mg total) by mouth daily    Easy bruising  -     CBC and differential  -     Lipid panel  -     Comprehensive metabolic panel  -     TSH, 3rd generation  -     Vitamin D 25 hydroxy  -     Prothrombin w/INR + Partial Thromboplastin Times; Future    Well adult exam  -     CBC and differential  -     Lipid panel  -     Comprehensive metabolic panel  -     TSH, 3rd generation  -     Vitamin D 25 hydroxy  -     Prothrombin w/INR + Partial Thromboplastin Times; Future    [facility]anoma of skin ([facility] )    Laceration of right great toe without foreign body present or damage to nail, initial encounter  -     TDAP VACCINE GREATER THAN OR EQUAL TO 8YO IM    Need for hepatitis C screening test  -     Hepatitis C Antibody ([facility]); Future    Encounter for immunization            Subjective:        Patient ID: Fabio Yost is a 73 y o  female  Patient presents with:  Medicare Wellness Visit  Eye Bleed: Patient complains of surface bleed for about 6 weeks    She is in today for follow-up  Does state that her optometrist has noticed conjunctiva limb vitreous bleeding  She recommended a clotting workup  Also she complains of joint pain in her hands    She has been taking Advil with relief but is wondering if there is something is better as far as the clotting and stomach goes  The following portions of the patient's history were reviewed and updated as appropriate: allergies, current medications, past family history, past medical history, past social history, past surgical history and problem list       Review of Systems   Musculoskeletal: Positive for arthralgias  Objective:             /80   Temp 97 6 °F (36 4 °C)   Ht 5' 4" (1 626 m)   Wt 77 9 kg (171 lb 12 8 oz)   LMP  (LMP Unknown)   BMI 29 49 kg/m²          Physical Exam  Vitals and nursing note reviewed  Constitutional:       Appearance: She is well-developed  HENT:      Head: Normocephalic and atraumatic  Right Ear: External ear normal       Left Ear: External ear normal       Nose: Nose normal    Eyes:      Conjunctiva/sclera: Conjunctivae normal       Pupils: Pupils are equal, round, and reactive to light  Cardiovascular:      Rate and Rhythm: Normal rate and regular rhythm  Heart sounds: Normal heart sounds  Pulmonary:      Effort: Pulmonary effort is normal       Breath sounds: Normal breath sounds  Abdominal:      General: Bowel sounds are normal       Palpations: Abdomen is soft  Musculoskeletal:      Cervical back: Normal range of motion and neck supple  Skin:     General: Skin is warm and dry  Neurological:      Mental Status: She is alert and oriented to person, place, and time  Deep Tendon Reflexes: Reflexes are normal and symmetric     Psychiatric:         Behavior: Behavior normal

## 2021-08-23 ENCOUNTER — APPOINTMENT (OUTPATIENT)
Dept: LAB | Age: 71
End: 2021-08-23
Payer: COMMERCIAL

## 2021-08-23 DIAGNOSIS — Z00.00 MEDICARE ANNUAL WELLNESS VISIT, SUBSEQUENT: ICD-10-CM

## 2021-08-23 DIAGNOSIS — Z00.00 WELL ADULT EXAM: ICD-10-CM

## 2021-08-23 DIAGNOSIS — R23.8 EASY BRUISING: ICD-10-CM

## 2021-08-23 DIAGNOSIS — Z11.59 NEED FOR HEPATITIS C SCREENING TEST: ICD-10-CM

## 2021-08-23 LAB
25(OH)D3 SERPL-MCNC: 29.1 NG/ML (ref 30–100)
ALBUMIN SERPL BCP-MCNC: 3.7 G/DL (ref 3.5–5)
ALP SERPL-CCNC: 78 U/L (ref 46–116)
ALT SERPL W P-5'-P-CCNC: 23 U/L (ref 12–78)
ANION GAP SERPL CALCULATED.3IONS-SCNC: 5 MMOL/L (ref 4–13)
APTT PPP: 28 SECONDS (ref 23–37)
AST SERPL W P-5'-P-CCNC: 16 U/L (ref 5–45)
BASOPHILS # BLD AUTO: 0.03 THOUSANDS/ΜL (ref 0–0.1)
BASOPHILS NFR BLD AUTO: 1 % (ref 0–1)
BILIRUB SERPL-MCNC: 0.56 MG/DL (ref 0.2–1)
BUN SERPL-MCNC: 14 MG/DL (ref 5–25)
CALCIUM SERPL-MCNC: 9.1 MG/DL (ref 8.3–10.1)
CHLORIDE SERPL-SCNC: 108 MMOL/L (ref 100–108)
CHOLEST SERPL-MCNC: 212 MG/DL (ref 50–200)
CO2 SERPL-SCNC: 25 MMOL/L (ref 21–32)
CREAT SERPL-MCNC: 0.91 MG/DL (ref 0.6–1.3)
EOSINOPHIL # BLD AUTO: 0.19 THOUSAND/ΜL (ref 0–0.61)
EOSINOPHIL NFR BLD AUTO: 3 % (ref 0–6)
ERYTHROCYTE [DISTWIDTH] IN BLOOD BY AUTOMATED COUNT: 13.6 % (ref 11.6–15.1)
GFR SERPL CREATININE-BSD FRML MDRD: 65 ML/MIN/1.73SQ M
GLUCOSE P FAST SERPL-MCNC: 92 MG/DL (ref 65–99)
HCT VFR BLD AUTO: 42 % (ref 34.8–46.1)
HCV AB SER QL: NORMAL
HDLC SERPL-MCNC: 87 MG/DL
HGB BLD-MCNC: 13.5 G/DL (ref 11.5–15.4)
IMM GRANULOCYTES # BLD AUTO: 0.02 THOUSAND/UL (ref 0–0.2)
IMM GRANULOCYTES NFR BLD AUTO: 0 % (ref 0–2)
INR PPP: 0.99 (ref 0.84–1.19)
LDLC SERPL CALC-MCNC: 102 MG/DL (ref 0–100)
LYMPHOCYTES # BLD AUTO: 2.49 THOUSANDS/ΜL (ref 0.6–4.47)
LYMPHOCYTES NFR BLD AUTO: 38 % (ref 14–44)
MCH RBC QN AUTO: 30.1 PG (ref 26.8–34.3)
MCHC RBC AUTO-ENTMCNC: 32.1 G/DL (ref 31.4–37.4)
MCV RBC AUTO: 94 FL (ref 82–98)
MONOCYTES # BLD AUTO: 0.53 THOUSAND/ΜL (ref 0.17–1.22)
MONOCYTES NFR BLD AUTO: 8 % (ref 4–12)
NEUTROPHILS # BLD AUTO: 3.22 THOUSANDS/ΜL (ref 1.85–7.62)
NEUTS SEG NFR BLD AUTO: 50 % (ref 43–75)
NONHDLC SERPL-MCNC: 125 MG/DL
NRBC BLD AUTO-RTO: 0 /100 WBCS
PLATELET # BLD AUTO: 275 THOUSANDS/UL (ref 149–390)
PMV BLD AUTO: 9.4 FL (ref 8.9–12.7)
POTASSIUM SERPL-SCNC: 3.9 MMOL/L (ref 3.5–5.3)
PROT SERPL-MCNC: 6.9 G/DL (ref 6.4–8.2)
PROTHROMBIN TIME: 13.1 SECONDS (ref 11.6–14.5)
RBC # BLD AUTO: 4.49 MILLION/UL (ref 3.81–5.12)
SODIUM SERPL-SCNC: 138 MMOL/L (ref 136–145)
TRIGL SERPL-MCNC: 117 MG/DL
TSH SERPL DL<=0.05 MIU/L-ACNC: 1.94 UIU/ML (ref 0.36–3.74)
WBC # BLD AUTO: 6.48 THOUSAND/UL (ref 4.31–10.16)

## 2021-08-23 PROCEDURE — 82306 VITAMIN D 25 HYDROXY: CPT | Performed by: FAMILY MEDICINE

## 2021-08-23 PROCEDURE — 80053 COMPREHEN METABOLIC PANEL: CPT | Performed by: FAMILY MEDICINE

## 2021-08-23 PROCEDURE — 36415 COLL VENOUS BLD VENIPUNCTURE: CPT | Performed by: FAMILY MEDICINE

## 2021-08-23 PROCEDURE — 84443 ASSAY THYROID STIM HORMONE: CPT | Performed by: FAMILY MEDICINE

## 2021-08-23 PROCEDURE — 85610 PROTHROMBIN TIME: CPT

## 2021-08-23 PROCEDURE — 86803 HEPATITIS C AB TEST: CPT

## 2021-08-23 PROCEDURE — 85730 THROMBOPLASTIN TIME PARTIAL: CPT

## 2021-08-23 PROCEDURE — 80061 LIPID PANEL: CPT | Performed by: FAMILY MEDICINE

## 2021-08-23 PROCEDURE — 85025 COMPLETE CBC W/AUTO DIFF WBC: CPT | Performed by: FAMILY MEDICINE

## 2021-08-24 ENCOUNTER — TELEPHONE (OUTPATIENT)
Dept: FAMILY MEDICINE CLINIC | Facility: CLINIC | Age: 71
End: 2021-08-24

## 2021-08-24 NOTE — TELEPHONE ENCOUNTER
----- Message from Christiano Espinoza DO sent at 8/24/2021  7:23 AM EDT -----  Please call the patient regarding her  result  Lab results are good  No sign of clotting dysfunction  Her cholesterol is slightly elevated and I would recommend working on diet exercise to help control cholesterol  Her vitamin-D is low  I recommend supplementation with vitamin D3 1000 International Units daily  Will discuss at next visit

## 2021-11-03 ENCOUNTER — HOSPITAL ENCOUNTER (OUTPATIENT)
Dept: RADIOLOGY | Age: 71
Discharge: HOME | End: 2021-11-03
Payer: COMMERCIAL

## 2021-11-03 VITALS — HEIGHT: 64 IN | WEIGHT: 170 LBS | BODY MASS INDEX: 29.02 KG/M2

## 2021-11-03 DIAGNOSIS — Z12.31 VISIT FOR SCREENING MAMMOGRAM: ICD-10-CM

## 2021-11-03 DIAGNOSIS — Z12.31 ENCOUNTER FOR SCREENING MAMMOGRAM FOR MALIGNANT NEOPLASM OF BREAST: ICD-10-CM

## 2021-11-03 PROCEDURE — 77063 BREAST TOMOSYNTHESIS BI: CPT

## 2021-11-03 PROCEDURE — 77067 SCR MAMMO BI INCL CAD: CPT

## 2022-01-29 ENCOUNTER — HOSPITAL ENCOUNTER (OUTPATIENT)
Dept: RADIOLOGY | Facility: HOSPITAL | Age: 72
Discharge: HOME | End: 2022-01-29
Payer: COMMERCIAL

## 2022-01-29 DIAGNOSIS — Z91.89 INCREASED RISK OF BREAST CANCER: ICD-10-CM

## 2022-01-29 PROCEDURE — A9585 GADOBUTROL INJECTION: HCPCS | Performed by: NURSE PRACTITIONER

## 2022-01-29 PROCEDURE — C8908 MRI W/O FOL W/CONT, BREAST,: HCPCS

## 2022-01-29 PROCEDURE — G1004 CDSM NDSC: HCPCS

## 2022-01-29 PROCEDURE — C8937 CAD BREAST MRI: HCPCS

## 2022-01-29 RX ADMIN — GADOBUTROL 7 ML: 604.72 INJECTION INTRAVENOUS at 09:22

## 2022-02-09 DIAGNOSIS — F41.9 ANXIETY: ICD-10-CM

## 2022-02-10 RX ORDER — MELOXICAM 15 MG/1
15 TABLET ORAL DAILY
Qty: 30 TABLET | Refills: 5 | Status: SHIPPED | OUTPATIENT
Start: 2022-02-10

## 2022-04-04 DIAGNOSIS — F41.9 ANXIETY: ICD-10-CM

## 2022-04-04 RX ORDER — ALPRAZOLAM 0.5 MG/1
0.5 TABLET ORAL
Qty: 30 TABLET | Refills: 0 | Status: SHIPPED | OUTPATIENT
Start: 2022-04-04

## 2022-04-19 ENCOUNTER — OFFICE VISIT (OUTPATIENT)
Dept: SURGICAL ONCOLOGY | Facility: CLINIC | Age: 72
End: 2022-04-19
Payer: COMMERCIAL

## 2022-04-19 VITALS
RESPIRATION RATE: 18 BRPM | HEIGHT: 64 IN | WEIGHT: 176 LBS | TEMPERATURE: 97.6 F | OXYGEN SATURATION: 99 % | BODY MASS INDEX: 30.05 KG/M2 | DIASTOLIC BLOOD PRESSURE: 100 MMHG | HEART RATE: 91 BPM | SYSTOLIC BLOOD PRESSURE: 152 MMHG

## 2022-04-19 DIAGNOSIS — R92.2 DENSE BREASTS: ICD-10-CM

## 2022-04-19 DIAGNOSIS — N60.99 BREAST DUCTAL HYPERPLASIA, ATYPICAL: ICD-10-CM

## 2022-04-19 DIAGNOSIS — Z91.89 INCREASED RISK OF BREAST CANCER: ICD-10-CM

## 2022-04-19 DIAGNOSIS — Z12.31 VISIT FOR SCREENING MAMMOGRAM: ICD-10-CM

## 2022-04-19 PROCEDURE — 1036F TOBACCO NON-USER: CPT | Performed by: NURSE PRACTITIONER

## 2022-04-19 PROCEDURE — 99213 OFFICE O/P EST LOW 20 MIN: CPT | Performed by: NURSE PRACTITIONER

## 2022-04-19 PROCEDURE — 1160F RVW MEDS BY RX/DR IN RCRD: CPT | Performed by: NURSE PRACTITIONER

## 2022-04-19 PROCEDURE — 3008F BODY MASS INDEX DOCD: CPT | Performed by: NURSE PRACTITIONER

## 2022-04-19 NOTE — PROGRESS NOTES
Surgical Oncology Follow Up       [facility],[address] [place] [address]  [facility]  [address] [address]    Agata Trent  1950  [id number]  [facility],[address] [place] [address]  [facility]  [address] [address]    Chief Complaint   Patient presents with    Follow-up       Assessment/Plan:  1  Increased risk of breast cancer  - 1 year follow up   - MRI breast bilateral w and wo contrast w cad; Future    2  Breast ductal hyperplasia, atypical    3  Dense breasts    4  Visit for screening mammogram  - Mammo screening bilateral w 3d & cad; Future      Discussion/Summary:  Patient is a 29-year-old female presenting today for 1 year follow-up for increased risk of breast cancer and history of ADH  She underwent a left breast lumpectomy in 2010 and excision of fibroadenomas in 2012  She had a bilateral screening mammogram on 11/03/2021 which was BI-RADS 2 category 3 density  She also had a bilateral breast MRI which was BI-RADS 2  On the right breast there is a 7 millimeter enhancing mass in the superior central breast associated with a biopsy clip these findings are stable with compared MRIs from 2017 and 2018  There are no concerns on patient's breast exam  I have ordered the patients annual mammogram and recommended 1 year follow up MRI  Will see her back in 1 year sooner should the need arise  She was instructed to call with any questions or concerns prior to time  All questions were answered today  History of Present Illness:     Oncology History    No history exists         -Interval History: Patient is a 29-year-old female presenting today for 1 year follow-up for increased risk of breast cancer and history of ADH  She had a bilateral screening mammogram on 11/03/2021 which was BI-RADS 2 category 3 density  She also had a bilateral breast MRI which was BI-RADS 2    On the right breast there is a 7 millimeter enhancing mass in the superior central breast associated with a biopsy clip these findings are stable with compared MRIs from 2017 and 2018  She denies changes on her breast exam   She denies persistent headache, bone pain, back pain, SOB, abdominal pain  Review of Systems:  Review of Systems   Constitutional: Negative for activity change, appetite change, fatigue and unexpected weight change  Respiratory: Negative for cough and shortness of breath  Cardiovascular: Negative for chest pain  Gastrointestinal: Negative for abdominal pain, diarrhea, nausea and vomiting  Endocrine: Negative for heat intolerance  Musculoskeletal: Negative for arthralgias, back pain and myalgias  Skin: Negative for rash  Neurological: Negative for weakness and headaches  Hematological: Negative for adenopathy         Patient Active Problem List   Diagnosis    Anxiety    Arthritis    Breast ductal hyperplasia, atypical    Esophageal reflux    Headache, unspecified headache type    [facility] ([facility] )    Vasovagal syncope    Abnormal finding on breast imaging    Family history of pancreatic cancer    Increased risk of breast cancer    Breast pain, left     Past Medical History:   Diagnosis Date    Melanoma ([facility] ) 2014     Past Surgical History:   Procedure Laterality Date    BREAST BIOPSY Right 03/23/2012    US guided benign    BREAST BIOPSY Right 11/23/2010    benign    BREAST CYST EXCISION Left 12/23/2010    benign     Family History   Problem Relation Age of Onset    Colon cancer Other [facility]    No Known Problems Mother     No Known Problems Father     No Known Problems Sister     No Known Problems Maternal Grandmother     No Known Problems Maternal Grandfather     Pancreatic cancer Paternal Grandmother 76    No Known Problems Paternal Grandfather     No Known Problems Brother     No Known Problems Brother     No Known Problems Brother     No Known Problems Son     No Known Problems Son     Uterine cancer Paternal Aunt         52's    Breast cancer Cousin         66's     Social History     Socioeconomic History    Marital status: /Civil Union     Spouse name: Not on file    Number of children: Not on file    Years of education: Not on file    Highest education level: Not on file   Occupational History    Not on file   Tobacco Use    Smoking status: Former Smoker     Types: Cigarettes    Smokeless tobacco: Never Used   Substance and Sexual Activity    Alcohol use: Not on file    Drug use: Not on file    Sexual activity: Not on file   Other Topics Concern    Not on file   Social History Narrative    Not on file     Social Determinants of Health     Financial Resource Strain: Not on file   Food Insecurity: Not on file   Transportation Needs: Not on file   Physical Activity: Not on file   Stress: Not on file   Social Connections: Not on file   Intimate Partner Violence: Not on file   Housing Stability: Not on file       Current Outpatient Medications:     ALPRAZolam (XANAX) 0 5 mg tablet, Take 1 tablet (0 5 mg total) by mouth daily at bedtime as needed for anxiety, Disp: 30 tablet, Rfl: 0    cholecalciferol (VITAMIN D3) 1,000 units tablet, Take 1,000 Units by mouth daily, Disp: , Rfl:     meloxicam (MOBIC) 15 mg tablet, Take 1 tablet (15 mg total) by mouth daily, Disp: 30 tablet, Rfl: 5  Allergies   Allergen Reactions    Erythromycin Other (See Comments)     Other reaction(s): trouble breathing    Naproxen Abdominal Pain    Nickel Itching     Vitals:    04/19/22 0800   BP: 152/100   Pulse: 91   Resp: 18   Temp: 97 6 °F (36 4 °C)   SpO2: 99%       Physical Exam  Constitutional:       General: She is not in acute distress  Appearance: Normal appearance  Cardiovascular:      Rate and Rhythm: Normal rate and regular rhythm  Pulses: Normal pulses  Heart sounds: Normal heart sounds     Pulmonary:      Effort: Pulmonary effort is normal       Breath sounds: Normal breath sounds  Chest:      Chest wall: No mass  Breasts:      Right: No swelling, bleeding, inverted nipple, mass, nipple discharge, skin change, tenderness, axillary adenopathy or supraclavicular adenopathy  Left: No swelling, bleeding, inverted nipple, mass, nipple discharge, skin change, tenderness, axillary adenopathy or supraclavicular adenopathy  Comments: No masses, nodularity, skin changes, nipple changes discharge, or adenopathy  Abdominal:      General: Abdomen is flat  Palpations: Abdomen is soft  Lymphadenopathy:      Upper Body:      Right upper body: No supraclavicular, axillary or pectoral adenopathy  Left upper body: No supraclavicular, axillary or pectoral adenopathy  Skin:     General: Skin is warm  Neurological:      General: No focal deficit present  Mental Status: She is alert and oriented to person, place, and time  Psychiatric:         Mood and Affect: Mood normal          Behavior: Behavior normal            Results:    Imaging  No results found  I reviewed the above imaging data  Advance Care Planning/Advance Directives:  Discussed disease status, cancer treatment plans and/or cancer treatment goals with the patient

## 2022-08-31 ENCOUNTER — VBI (OUTPATIENT)
Dept: ADMINISTRATIVE | Facility: OTHER | Age: 72
End: 2022-08-31

## 2022-12-19 ENCOUNTER — HOSPITAL ENCOUNTER (OUTPATIENT)
Dept: RADIOLOGY | Age: 72
Discharge: HOME | End: 2022-12-19

## 2022-12-19 VITALS — BODY MASS INDEX: 29.02 KG/M2 | HEIGHT: 64 IN | WEIGHT: 170 LBS

## 2022-12-19 DIAGNOSIS — Z12.31 VISIT FOR SCREENING MAMMOGRAM: ICD-10-CM

## 2022-12-19 DIAGNOSIS — Z12.31 ENCOUNTER FOR SCREENING MAMMOGRAM FOR MALIGNANT NEOPLASM OF BREAST: ICD-10-CM

## 2023-01-30 ENCOUNTER — HOSPITAL ENCOUNTER (OUTPATIENT)
Dept: RADIOLOGY | Facility: HOSPITAL | Age: 73
Discharge: HOME | End: 2023-01-30

## 2023-01-30 DIAGNOSIS — Z91.89 INCREASED RISK OF BREAST CANCER: ICD-10-CM

## 2023-01-30 DIAGNOSIS — R92.2 DENSE BREASTS: ICD-10-CM

## 2023-01-30 RX ADMIN — GADOBUTROL 7 ML: 604.72 INJECTION INTRAVENOUS at 16:51

## 2023-02-06 ENCOUNTER — TELEPHONE (OUTPATIENT)
Dept: FAMILY MEDICINE CLINIC | Facility: CLINIC | Age: 73
End: 2023-02-06

## 2023-08-30 DIAGNOSIS — F41.9 ANXIETY: Primary | ICD-10-CM

## 2023-08-30 RX ORDER — ALPRAZOLAM 0.25 MG/1
TABLET ORAL
COMMUNITY
End: 2023-08-30 | Stop reason: CLARIF

## 2023-08-30 RX ORDER — ALPRAZOLAM 0.5 MG/1
TABLET ORAL
COMMUNITY
End: 2023-09-05 | Stop reason: SDUPTHER

## 2023-08-31 RX ORDER — ALPRAZOLAM 0.5 MG/1
0.5 TABLET ORAL
Qty: 30 TABLET | Refills: 0 | OUTPATIENT
Start: 2023-08-31

## 2023-09-05 ENCOUNTER — OFFICE VISIT (OUTPATIENT)
Dept: FAMILY MEDICINE CLINIC | Facility: CLINIC | Age: 73
End: 2023-09-05
Payer: COMMERCIAL

## 2023-09-05 VITALS
HEIGHT: 64 IN | BODY MASS INDEX: 29.91 KG/M2 | OXYGEN SATURATION: 98 % | DIASTOLIC BLOOD PRESSURE: 98 MMHG | TEMPERATURE: 98.2 F | HEART RATE: 70 BPM | WEIGHT: 175.2 LBS | SYSTOLIC BLOOD PRESSURE: 144 MMHG

## 2023-09-05 DIAGNOSIS — C43.9 MELANOMA OF SKIN (HCC): ICD-10-CM

## 2023-09-05 DIAGNOSIS — F41.9 ANXIETY: Primary | ICD-10-CM

## 2023-09-05 PROCEDURE — 99214 OFFICE O/P EST MOD 30 MIN: CPT | Performed by: FAMILY MEDICINE

## 2023-09-05 RX ORDER — ALPRAZOLAM 0.5 MG/1
0.5 TABLET ORAL
Qty: 30 TABLET | Refills: 0 | Status: SHIPPED | OUTPATIENT
Start: 2023-09-05

## 2023-09-05 NOTE — PROGRESS NOTES
Assessment/Plan:      Diagnoses and all orders for this visit:    Anxiety  -     ALPRAZolam (XANAX) 0.5 mg tablet; Take 1 tablet (0.5 mg total) by mouth daily at bedtime as needed for anxiety    Melanoma of skin ([address])        She does have a trip to [place] planned in the near future which she may have to cancel. Otherwise she will follow-up for a annual well visit. Subjective:     Patient ID: Douglas Odom is a 72 y.o. female. Patient presents with: Follow-up: F/U for medication refill for xanax. BMI F/U plan. Patient declines dexa scan. No other questions, concerns, problems today (CS)          Review of Systems   Constitutional: Negative. HENT: Negative. Eyes: Negative. Respiratory: Negative. Cardiovascular: Negative. Gastrointestinal: Negative. Endocrine: Negative. Genitourinary: Negative. Musculoskeletal: Negative. Skin: Negative. Allergic/Immunologic: Negative. Neurological: Negative. Hematological: Negative. Psychiatric/Behavioral: The patient is nervous/anxious. All other systems reviewed and are negative. Objective:     Physical Exam  Vitals and nursing note reviewed. Constitutional:       Appearance: She is well-developed. HENT:      Head: Normocephalic and atraumatic. Eyes:      Pupils: Pupils are equal, round, and reactive to light. Neck:      Vascular: No JVD. Cardiovascular:      Rate and Rhythm: Normal rate. Pulmonary:      Effort: Pulmonary effort is normal.   Abdominal:      Palpations: Abdomen is soft. Musculoskeletal:      Cervical back: Normal range of motion. Lymphadenopathy:      Cervical: No cervical adenopathy. Neurological:      Mental Status: She is alert and oriented to person, place, and time.

## 2023-09-28 ENCOUNTER — OFFICE VISIT (OUTPATIENT)
Dept: FAMILY MEDICINE CLINIC | Facility: CLINIC | Age: 73
End: 2023-09-28
Payer: COMMERCIAL

## 2023-09-28 VITALS
WEIGHT: 175.6 LBS | SYSTOLIC BLOOD PRESSURE: 140 MMHG | BODY MASS INDEX: 30.14 KG/M2 | TEMPERATURE: 98.2 F | DIASTOLIC BLOOD PRESSURE: 90 MMHG | HEART RATE: 75 BPM | OXYGEN SATURATION: 99 %

## 2023-09-28 DIAGNOSIS — C43.9 MELANOMA OF SKIN (HCC): ICD-10-CM

## 2023-09-28 DIAGNOSIS — K21.00 GASTROESOPHAGEAL REFLUX DISEASE WITH ESOPHAGITIS WITHOUT HEMORRHAGE: ICD-10-CM

## 2023-09-28 DIAGNOSIS — Z00.00 MEDICARE ANNUAL WELLNESS VISIT, SUBSEQUENT: Primary | ICD-10-CM

## 2023-09-28 DIAGNOSIS — M19.90 ARTHRITIS: ICD-10-CM

## 2023-09-28 PROCEDURE — G0439 PPPS, SUBSEQ VISIT: HCPCS | Performed by: FAMILY MEDICINE

## 2023-09-28 PROCEDURE — 99214 OFFICE O/P EST MOD 30 MIN: CPT | Performed by: FAMILY MEDICINE

## 2023-09-28 NOTE — PROGRESS NOTES
Assessment and Plan:     Problem List Items Addressed This Visit        Digestive    Esophageal reflux       Musculoskeletal and Integument    Arthritis    Melanoma of skin ([address])   Other Visit Diagnoses     Medicare annual wellness visit, subsequent    -  Primary    Relevant Orders    Lipid panel    Hemoglobin A1C    Comprehensive metabolic panel    CBC and differential        BMI Counseling: Body mass index is 30.14 kg/m². The BMI is above normal. Nutrition recommendations include decreasing portion sizes, encouraging healthy choices of fruits and vegetables, decreasing fast food intake, consuming healthier snacks, limiting drinks that contain sugar, moderation in carbohydrate intake, increasing intake of lean protein, reducing intake of saturated and trans fat and reducing intake of cholesterol. Exercise recommendations include moderate physical activity 150 minutes/week. No pharmacotherapy was ordered. Rationale for BMI follow-up plan is due to patient being overweight or obese. Depression Screening and Follow-up Plan: Patient was screened for depression during today's encounter. They screened negative with a PHQ-2 score of 0. Preventive health issues were discussed with patient, and age appropriate screening tests were ordered as noted in patient's After Visit Summary. Personalized health advice and appropriate referrals for health education or preventive services given if needed, as noted in patient's After Visit Summary. History of Present Illness:     Patient presents for a Medicare Wellness Visit    Patient presents with:  Medicare Wellness Visit    She is in today for follow-up. She does complain of right knee pain which is being followed and may need to be replaced. Otherwise she is doing well.      Patient Care Team:  Gregory Braswell DO as PCP - General (Family Medicine)  MD Mishel Donnelly, [address] [place] [address] (Inactive)  Kolton Medellin MD     Review of Systems:     Review of Systems Constitutional: Negative. HENT: Negative. Eyes: Negative. Respiratory: Negative. Cardiovascular: Negative. Gastrointestinal: Negative. Endocrine: Negative. Genitourinary: Negative. Musculoskeletal: Positive for arthralgias. Skin: Negative. Allergic/Immunologic: Negative. Neurological: Negative. Hematological: Negative. Psychiatric/Behavioral: Negative. All other systems reviewed and are negative.        Problem List:     Patient Active Problem List   Diagnosis   • Anxiety   • Arthritis   • Breast ductal hyperplasia, atypical   • Esophageal reflux   • Headache, unspecified headache type   • Melanoma of skin ([address])   • Vasovagal syncope   • Abnormal finding on breast imaging   • Family history of pancreatic cancer   • Increased risk of breast cancer   • Breast pain, left      Past Medical and Surgical History:     Past Medical History:   Diagnosis Date   • Allergic     Already in histort   • Anxiety 1995    Infrequent   • Breast disorder 2010   • Cancer ([address]) 2014   • Melanoma ([address]) 2014   • Shingles 2016     Past Surgical History:   Procedure Laterality Date   • BREAST BIOPSY Right 03/23/2012    US guided benign   • BREAST BIOPSY Right 11/23/2010    benign   • BREAST CYST EXCISION Left 12/23/2010    benign   • BREAST LUMPECTOMY  2016   • COLONOSCOPY  2016    Updated 2022      Family History:     Family History   Problem Relation Age of Onset   • Colon cancer Other 29   • No Known Problems Mother    • Depression Father    • Coronary artery disease Father    • No Known Problems Sister    • No Known Problems Maternal Grandmother    • No Known Problems Maternal Grandfather    • Pancreatic cancer Paternal Grandmother 76   • Cancer Paternal Grandmother    • No Known Problems Paternal Grandfather    • No Known Problems Brother    • No Known Problems Brother    • No Known Problems Brother    • No Known Problems Son    • No Known Problems Son    • Uterine cancer Paternal Aunt 52's   • Breast cancer Cousin         66's      Social History:     Social History     Socioeconomic History   • Marital status: /Civil Union     Spouse name: None   • Number of children: None   • Years of education: None   • Highest education level: None   Occupational History   • None   Tobacco Use   • Smoking status: Former     Packs/day: 1.00     Years: 10.00     Total pack years: 10.00     Types: Cigarettes     Start date: 10/10/1974     Quit date: 10/10/1984     Years since quittin.9   • Smokeless tobacco: Never   Vaping Use   • Vaping Use: Never used   Substance and Sexual Activity   • Alcohol use: Yes     Alcohol/week: 2.0 standard drinks of alcohol     Types: 2 Glasses of wine per week   • Drug use: Never   • Sexual activity: Yes     Partners: Male     Comment: Not applicable   Other Topics Concern   • None   Social History Narrative   • None     Social Determinants of Health     Financial Resource Strain: Low Risk  (2023)    Overall Financial Resource Strain (CARDIA)    • Difficulty of Paying Living Expenses: Not hard at all   Food Insecurity: Not on file   Transportation Needs: No Transportation Needs (2023)    PRAPARE - Transportation    • Lack of Transportation (Medical): No    • Lack of Transportation (Non-Medical): No   Physical Activity: Not on file   Stress: Not on file   Social Connections: Not on file   Intimate Partner Violence: Not on file   Housing Stability: Not on file      Medications and Allergies:     Current Outpatient Medications   Medication Sig Dispense Refill   • ALPRAZolam (XANAX) 0.5 mg tablet Take 1 tablet (0.5 mg total) by mouth daily at bedtime as needed for anxiety 30 tablet 0   • cholecalciferol (VITAMIN D3) 1,000 units tablet Take 1,000 Units by mouth daily       No current facility-administered medications for this visit.      Allergies   Allergen Reactions   • Erythromycin Other (See Comments)     Other reaction(s): trouble breathing   • Naproxen Abdominal Pain   • Nickel Itching      Immunizations:     Immunization History   Administered Date(s) Administered   • COVID-19 MODERNA VACC 0.5 ML IM 02/03/2021, 03/03/2021, 11/15/2021, 04/25/2022   • COVID-19 Moderna Vac BIVALENT 12 Yr+ IM 0.5 ML 09/16/2022, 07/14/2023   • INFLUENZA 11/08/2020, 11/06/2021, 11/06/2022   • TD (adult) Preservative Free 01/01/2014   • Td (adult), adsorbed 01/01/2014   • Tdap 08/17/2021      Health Maintenance:         Topic Date Due   • Breast Cancer Screening: Mammogram  12/19/2023   • Colorectal Cancer Screening  08/30/2024   • Hepatitis C Screening  Completed         Topic Date Due   • Pneumococcal Vaccine: 65+ Years (1 - PCV) Never done   • Influenza Vaccine (1) 09/01/2023      Medicare Screening Tests and Risk Assessments:     Amara Hewitt is here for her Subsequent Wellness visit. Health Risk Assessment:   Patient rates overall health as very good. Patient feels that their physical health rating is same. Patient is very satisfied with their life. Eyesight was rated as slightly worse. Hearing was rated as same. Patient feels that their emotional and mental health rating is slightly better. Patients states they are never, rarely angry. Patient states they are sometimes unusually tired/fatigued. Pain experienced in the last 7 days has been none. Patient states that she has experienced no weight loss or gain in last 6 months. Depression Screening:   PHQ-2 Score: 0      Fall Risk Screening: In the past year, patient has experienced: history of falling in past year      Urinary Incontinence Screening:   Patient has not leaked urine accidently in the last six months. Home Safety:  Patient does not have trouble with stairs inside or outside of their home. Patient has working smoke alarms and has working carbon monoxide detector. Home safety hazards include: none. Nutrition:   Current diet is Regular.      Medications:   Patient is currently taking over-the-counter supplements. OTC medications include: Vitamin D. Patient is able to manage medications. Activities of Daily Living (ADLs)/Instrumental Activities of Daily Living (IADLs):   Walk and transfer into and out of bed and chair?: Yes  Dress and groom yourself?: Yes    Bathe or shower yourself?: Yes    Feed yourself? Yes  Do your laundry/housekeeping?: Yes  Manage your money, pay your bills and track your expenses?: Yes  Make your own meals?: Yes    Do your own shopping?: Yes    Previous Hospitalizations:   Any hospitalizations or ED visits within the last 12 months?: No      Advance Care Planning:   Living will: Yes    Durable POA for healthcare: Yes    Advanced directive: Yes      PREVENTIVE SCREENINGS      Cardiovascular Screening:    General: Screening Current      Diabetes Screening:     General: Screening Not Indicated, History Diabetes and Risks and Benefits Discussed      Colorectal Cancer Screening:     General: Screening Current      Breast Cancer Screening:     General: Screening Current      Cervical Cancer Screening:    General: Screening Not Indicated      Osteoporosis Screening:    General: Risks and Benefits Discussed      Abdominal Aortic Aneurysm (AAA) Screening:        General: Risks and Benefits Discussed      Lung Cancer Screening:     General: Screening Not Indicated      Hepatitis C Screening:    General: Screening Current    Screening, Brief Intervention, and Referral to Treatment (SBIRT)    Screening  Typical number of drinks in a day: 0  Typical number of drinks in a week: 3  Interpretation: Low risk drinking behavior.     AUDIT-C Screenin) How often did you have a drink containing alcohol in the past year? 2 to 3 times a week  2) How many drinks did you have on a typical day when you were drinking in the past year? 0  3) How often did you have 6 or more drinks on one occasion in the past year? never    AUDIT-C Score: 3  Interpretation: Score 3-12 (female): POSITIVE screen for alcohol misuse    AUDIT Screenin) How often during the last year have you found that you were not able to stop drinking once you had started? 0 - never  5) How often during the last year have you failed to do what was normally expected from you because of drinking? 0 - never  6) How often during the last year have you needed a first drink in the morning to get yourself going after a heavy drinking session? 0 - never  7) How often during the last year have you had a feeling of guilt or remorse after drinking? 0 - never  8) How often during the last year have you been unable to remember what happened the night before because you had been drinking? 0 - never  9) Have you or someone else been injured as a result of your drinking? 0 - no  10) Has a relative or friend or a doctor or another health worker been concerned about your drinking or suggested you cut down? 0 - no    AUDIT Score: 3  Interpretation: Low risk alcohol consumption    Single Item Drug Screening:  How often have you used an illegal drug (including marijuana) or a prescription medication for non-medical reasons in the past year? never    Single Item Drug Screen Score: 0  Interpretation: Negative screen for possible drug use disorder    No results found. Physical Exam:     /90 (BP Location: Left arm, Patient Position: Sitting, Cuff Size: Standard)   Pulse 75   Temp 98.2 °F (36.8 °C) (Temporal)   Wt 79.7 kg (175 lb 9.6 oz)   LMP  (LMP Unknown)   SpO2 99%   BMI 30.14 kg/m²     Physical Exam  Vitals and nursing note reviewed. Constitutional:       Appearance: Normal appearance. She is well-developed. HENT:      Head: Normocephalic. Eyes:      Pupils: Pupils are equal, round, and reactive to light. Cardiovascular:      Rate and Rhythm: Normal rate and regular rhythm. Pulses: Normal pulses. Heart sounds: Normal heart sounds. Pulmonary:      Effort: Pulmonary effort is normal.      Breath sounds: Normal breath sounds. Abdominal:      General: Abdomen is flat. Bowel sounds are normal.      Palpations: Abdomen is soft. Musculoskeletal:      Cervical back: Normal range of motion and neck supple. Skin:     General: Skin is warm and dry. Capillary Refill: Capillary refill takes less than 2 seconds. Neurological:      General: No focal deficit present. Mental Status: She is alert and oriented to person, place, and time.    Psychiatric:         Mood and Affect: Mood normal.         Behavior: Behavior normal.          Mil Dorado, DO

## 2023-10-10 ENCOUNTER — APPOINTMENT (OUTPATIENT)
Dept: LAB | Age: 73
End: 2023-10-10
Payer: COMMERCIAL

## 2023-10-10 LAB
ALBUMIN SERPL BCP-MCNC: 4.3 G/DL (ref 3.5–5)
ALP SERPL-CCNC: 66 U/L (ref 34–104)
ALT SERPL W P-5'-P-CCNC: 14 U/L (ref 7–52)
ANION GAP SERPL CALCULATED.3IONS-SCNC: 5 MMOL/L
AST SERPL W P-5'-P-CCNC: 15 U/L (ref 13–39)
BASOPHILS # BLD AUTO: 0.03 THOUSANDS/ÂΜL (ref 0–0.1)
BASOPHILS NFR BLD AUTO: 1 % (ref 0–1)
BILIRUB SERPL-MCNC: 0.52 MG/DL (ref 0.2–1)
BUN SERPL-MCNC: 18 MG/DL (ref 5–25)
CALCIUM SERPL-MCNC: 9.7 MG/DL (ref 8.4–10.2)
CHLORIDE SERPL-SCNC: 106 MMOL/L (ref 96–108)
CHOLEST SERPL-MCNC: 202 MG/DL
CO2 SERPL-SCNC: 29 MMOL/L (ref 21–32)
CREAT SERPL-MCNC: 0.93 MG/DL (ref 0.6–1.3)
EOSINOPHIL # BLD AUTO: 0.12 THOUSAND/ÂΜL (ref 0–0.61)
EOSINOPHIL NFR BLD AUTO: 2 % (ref 0–6)
ERYTHROCYTE [DISTWIDTH] IN BLOOD BY AUTOMATED COUNT: 13.7 % (ref 11.6–15.1)
EST. AVERAGE GLUCOSE BLD GHB EST-MCNC: 120 MG/DL
GFR SERPL CREATININE-BSD FRML MDRD: 62 ML/MIN/1.73SQ M
GLUCOSE P FAST SERPL-MCNC: 96 MG/DL (ref 65–99)
HBA1C MFR BLD: 5.8 %
HCT VFR BLD AUTO: 44.3 % (ref 34.8–46.1)
HDLC SERPL-MCNC: 85 MG/DL
HGB BLD-MCNC: 14.2 G/DL (ref 11.5–15.4)
IMM GRANULOCYTES # BLD AUTO: 0.02 THOUSAND/UL (ref 0–0.2)
IMM GRANULOCYTES NFR BLD AUTO: 0 % (ref 0–2)
LDLC SERPL CALC-MCNC: 98 MG/DL (ref 0–100)
LYMPHOCYTES # BLD AUTO: 2.53 THOUSANDS/ÂΜL (ref 0.6–4.47)
LYMPHOCYTES NFR BLD AUTO: 39 % (ref 14–44)
MCH RBC QN AUTO: 30.7 PG (ref 26.8–34.3)
MCHC RBC AUTO-ENTMCNC: 32.1 G/DL (ref 31.4–37.4)
MCV RBC AUTO: 96 FL (ref 82–98)
MONOCYTES # BLD AUTO: 0.53 THOUSAND/ÂΜL (ref 0.17–1.22)
MONOCYTES NFR BLD AUTO: 8 % (ref 4–12)
NEUTROPHILS # BLD AUTO: 3.34 THOUSANDS/ÂΜL (ref 1.85–7.62)
NEUTS SEG NFR BLD AUTO: 50 % (ref 43–75)
NONHDLC SERPL-MCNC: 117 MG/DL
NRBC BLD AUTO-RTO: 0 /100 WBCS
PLATELET # BLD AUTO: 328 THOUSANDS/UL (ref 149–390)
PMV BLD AUTO: 9.7 FL (ref 8.9–12.7)
POTASSIUM SERPL-SCNC: 4.4 MMOL/L (ref 3.5–5.3)
PROT SERPL-MCNC: 6.7 G/DL (ref 6.4–8.4)
RBC # BLD AUTO: 4.62 MILLION/UL (ref 3.81–5.12)
SODIUM SERPL-SCNC: 140 MMOL/L (ref 135–147)
TRIGL SERPL-MCNC: 94 MG/DL
WBC # BLD AUTO: 6.57 THOUSAND/UL (ref 4.31–10.16)

## 2024-01-08 ENCOUNTER — HOSPITAL ENCOUNTER (OUTPATIENT)
Dept: RADIOLOGY | Age: 74
Discharge: HOME | End: 2024-01-08
Payer: COMMERCIAL

## 2024-01-08 VITALS — WEIGHT: 175 LBS | HEIGHT: 64 IN | BODY MASS INDEX: 29.88 KG/M2

## 2024-01-08 DIAGNOSIS — Z12.31 VISIT FOR SCREENING MAMMOGRAM: ICD-10-CM

## 2024-01-08 PROCEDURE — 77067 SCR MAMMO BI INCL CAD: CPT

## 2024-01-08 PROCEDURE — 77063 BREAST TOMOSYNTHESIS BI: CPT

## 2024-04-19 ENCOUNTER — APPOINTMENT (OUTPATIENT)
Dept: LAB | Age: 74
End: 2024-04-19
Payer: COMMERCIAL

## 2024-04-19 DIAGNOSIS — Z91.89 INCREASED RISK OF BREAST CANCER: ICD-10-CM

## 2024-04-19 LAB
BUN SERPL-MCNC: 16 MG/DL (ref 5–25)
CREAT SERPL-MCNC: 0.99 MG/DL (ref 0.6–1.3)
GFR SERPL CREATININE-BSD FRML MDRD: 57 ML/MIN/1.73SQ M

## 2024-04-19 PROCEDURE — 36415 COLL VENOUS BLD VENIPUNCTURE: CPT

## 2024-04-19 PROCEDURE — 84520 ASSAY OF UREA NITROGEN: CPT

## 2024-04-19 PROCEDURE — 82565 ASSAY OF CREATININE: CPT

## 2024-05-14 ENCOUNTER — VBI (OUTPATIENT)
Dept: ADMINISTRATIVE | Facility: OTHER | Age: 74
End: 2024-05-14

## 2024-05-30 ENCOUNTER — HOSPITAL ENCOUNTER (OUTPATIENT)
Dept: RADIOLOGY | Facility: HOSPITAL | Age: 74
Discharge: HOME | End: 2024-05-30
Payer: COMMERCIAL

## 2024-05-30 DIAGNOSIS — Z91.89 INCREASED RISK OF BREAST CANCER: ICD-10-CM

## 2024-05-30 DIAGNOSIS — N60.99 BREAST DUCTAL HYPERPLASIA, ATYPICAL: ICD-10-CM

## 2024-05-30 PROCEDURE — C8908 MRI W/O FOL W/CONT, BREAST,: HCPCS

## 2024-05-30 PROCEDURE — G1004 CDSM NDSC: HCPCS

## 2024-05-30 PROCEDURE — C8937 CAD BREAST MRI: HCPCS

## 2024-05-30 PROCEDURE — A9585 GADOBUTROL INJECTION: HCPCS

## 2024-05-30 RX ORDER — GADOBUTROL 604.72 MG/ML
7 INJECTION INTRAVENOUS
Status: COMPLETED | OUTPATIENT
Start: 2024-05-30 | End: 2024-05-30

## 2024-05-30 RX ADMIN — GADOBUTROL 7 ML: 604.72 INJECTION INTRAVENOUS at 17:00

## 2024-06-04 ENCOUNTER — TELEPHONE (OUTPATIENT)
Dept: HEMATOLOGY ONCOLOGY | Facility: CLINIC | Age: 74
End: 2024-06-04

## 2024-06-04 NOTE — TELEPHONE ENCOUNTER
LM for patient that her appt. On 7/8/24 with Lara Rowe will need to be rescheduled due to the provider out of the office that day. Left number for Hop[facility] to call back.

## 2024-06-05 ENCOUNTER — TELEPHONE (OUTPATIENT)
Dept: HEMATOLOGY ONCOLOGY | Facility: CLINIC | Age: 74
End: 2024-06-05

## 2024-06-05 DIAGNOSIS — F41.9 ANXIETY: ICD-10-CM

## 2024-06-05 RX ORDER — ALPRAZOLAM 0.5 MG/1
0.5 TABLET ORAL
Qty: 30 TABLET | Refills: 0 | Status: SHIPPED | OUTPATIENT
Start: 2024-06-05

## 2024-06-05 NOTE — TELEPHONE ENCOUNTER
I called [facility] regarding an appointment that they have scheduled with YOLI Perez scheduled on  7/8/24 at 8:00am in [place] .      Appointment Change  Cancel, Reschedule, Change to Virtual      Who are you speaking with? Patient   If it is not the patient, is the caller listed on the communication consent form? N/A   Which provider is the appointment scheduled with? YOLI Perez   When was the original appointment scheduled?    Please list date and time 7/8/24 8am   At which location is the appointment scheduled to take place? [place]   Was the appointment rescheduled?     Was the appointment changed from an in person visit to a virtual visit?    If so, please list the details of the change. Appointment was rescheduled to 6/18/24 at 9:30am at [place].   What is the reason for the appointment change? Provider out of the office. Pt requested sooner appoint due to moving.       Was STAR transport scheduled? No   Does STAR transport need to be scheduled for the new visit (if applicable) No   Does the patient need an infusion appointment rescheduled? No   Does the patient have an upcoming infusion appointment scheduled? If so, when? No   Is the patient undergoing chemotherapy? No   For appointments cancelled with less than 24 hours:  Was the no-show policy reviewed? Yes

## 2024-06-18 ENCOUNTER — OFFICE VISIT (OUTPATIENT)
Dept: SURGICAL ONCOLOGY | Facility: CLINIC | Age: 74
End: 2024-06-18
Payer: COMMERCIAL

## 2024-06-18 VITALS
DIASTOLIC BLOOD PRESSURE: 90 MMHG | RESPIRATION RATE: 18 BRPM | WEIGHT: 173 LBS | HEART RATE: 85 BPM | HEIGHT: 64 IN | OXYGEN SATURATION: 95 % | SYSTOLIC BLOOD PRESSURE: 150 MMHG | BODY MASS INDEX: 29.53 KG/M2 | TEMPERATURE: 97.7 F

## 2024-06-18 DIAGNOSIS — Z80.0 FAMILY HISTORY OF PANCREATIC CANCER: ICD-10-CM

## 2024-06-18 DIAGNOSIS — N60.99 BREAST DUCTAL HYPERPLASIA, ATYPICAL: ICD-10-CM

## 2024-06-18 DIAGNOSIS — Z91.89 INCREASED RISK OF BREAST CANCER: Primary | ICD-10-CM

## 2024-06-18 PROCEDURE — 99213 OFFICE O/P EST LOW 20 MIN: CPT

## 2024-06-18 NOTE — PROGRESS NOTES
Surgical Oncology Follow Up       [address] [facility]Y [facility]  [address] [facility] [address]    Tonia Ordaz  1950  [id number]  [address] [facility] ONCOLOGY [facility]  [address] [facility] [address]    Chief Complaint   Patient presents with   • office visit       Assessment/Plan:  1. Increased risk of breast cancer  - PRN patient is moving  - MRI breast bilateral w and wo contrast w cad; Future    2. Family history of pancreatic cancer    3. Breast ductal hyperplasia, atypical       Discussion/Summary:  Patient is a 74-year-old female presenting today for 1 year follow-up for increased risk of breast cancer secondary to history of ADH. She underwent a left breast lumpectomy in 2010 and excision of fibroadenomas in 2012.  We have been following her annually with breast exams, mammograms and breast MRI. She had a bilateral screening mammogram on 1/8/24 which was BI-RADS 2 category 3 density.  She had a bilateral breast MRI on 5/30/24 which was benign. There were no concerns on her clinical breast exam. Patient states she is moving to [facility] in 2 months. I encouraged her to reach out to medical records to have her records sent out there when she has a new provider. She may always reach out to our office in the future with any questions or concerns. All questions were answered today.    History of Present Illness:     Oncology History    No history exists.        -Interval History: Patient is a 74-year-old female presenting today for 1 year follow-up for increased risk of breast cancer secondary to history of ADH. She had a bilateral screening mammogram on 1/8/24 which was BI-RADS 2 category 3 density.  She had a bilateral breast MRI on 5/30/24 which was benign. Patient states she is moving to [facility] in 2 months. She denies breast changes.    Review of Systems:  Review of Systems    Constitutional:  Negative for activity change, appetite change, fatigue and unexpected weight change.   Respiratory:  Negative for cough and shortness of breath.    Cardiovascular:  Negative for chest pain.   Gastrointestinal:  Negative for abdominal pain, diarrhea, nausea and vomiting.   Endocrine: Negative for heat intolerance.   Musculoskeletal:  Negative for arthralgias, back pain and myalgias.   Skin:  Negative for rash.   Neurological:  Negative for weakness and headaches.   Hematological:  Negative for adenopathy.       Patient Active Problem List   Diagnosis   • Anxiety   • Arthritis   • Breast ductal hyperplasia, atypical   • Esophageal reflux   • Headache, unspecified headache type   • Melanoma of skin (HCC)   • Vasovagal syncope   • Abnormal finding on breast imaging   • Family history of pancreatic cancer   • Increased risk of breast cancer   • Breast pain, left     Past Medical History:   Diagnosis Date   • Allergic     Already in histort   • Anxiety 1995    Infrequent   • Breast disorder 2010   • Cancer (HCC) 2014   • Melanoma (HCC) 2014   • Shingles 2016     Past Surgical History:   Procedure Laterality Date   • BREAST BIOPSY Right 03/23/2012    US guided benign   • BREAST BIOPSY Right 11/23/2010    benign   • BREAST CYST EXCISION Left 12/23/2010    benign   • BREAST LUMPECTOMY  2016   • COLONOSCOPY  2016    Updated 2022     Family History   Problem Relation Age of Onset   • No Known Problems Mother    • Depression Father    • Coronary artery disease Father    • No Known Problems Sister    • No Known Problems Maternal Grandmother    • No Known Problems Maternal Grandfather    • Pancreatic cancer Paternal Grandmother 75   • Cancer Paternal Grandmother    • No Known Problems Paternal Grandfather    • No Known Problems Brother    • No Known Problems Brother    • No Known Problems Brother    • No Known Problems Son    • No Known Problems Son    • Uterine cancer Paternal Aunt         50's   • Breast  cancer Cousin         70's   • Colon cancer Other 34     Social History     Socioeconomic History   • Marital status: /Civil Union     Spouse name: Not on file   • Number of children: Not on file   • Years of education: Not on file   • Highest education level: Not on file   Occupational History   • Not on file   Tobacco Use   • Smoking status: Former     Current packs/day: 0.00     Average packs/day: 1 pack/day for 10.0 years (10.0 ttl pk-yrs)     Types: Cigarettes     Start date: 10/10/1974     Quit date: 10/10/1984     Years since quittin.7   • Smokeless tobacco: Never   Vaping Use   • Vaping status: Never Used   Substance and Sexual Activity   • Alcohol use: Yes     Alcohol/week: 2.0 standard drinks of alcohol     Types: 2 Glasses of wine per week   • Drug use: Never   • Sexual activity: Yes     Partners: Male     Comment: Not applicable   Other Topics Concern   • Not on file   Social History Narrative   • Not on file     Social Determinants of Health     Financial Resource Strain: Low Risk  (2023)    Overall Financial Resource Strain (CARDIA)    • Difficulty of Paying Living Expenses: Not hard at all   Food Insecurity: Not on file   Transportation Needs: No Transportation Needs (2023)    PRAPARE - Transportation    • Lack of Transportation (Medical): No    • Lack of Transportation (Non-Medical): No   Physical Activity: Not on file   Stress: Not on file   Social Connections: Not on file   Intimate Partner Violence: Not on file   Housing Stability: Not on file       Current Outpatient Medications:   •  ALPRAZolam (XANAX) 0.5 mg tablet, Take 1 tablet (0.5 mg total) by mouth daily at bedtime as needed for anxiety, Disp: 30 tablet, Rfl: 0  •  cholecalciferol (VITAMIN D3) 1,000 units tablet, Take 1,000 Units by mouth daily, Disp: , Rfl:   Allergies   Allergen Reactions   • Erythromycin Other (See Comments)     Other reaction(s): trouble breathing   • Naproxen Abdominal Pain   • Nickel Itching      Vitals:    06/18/24 1421   BP: 150/90   Pulse: 85   Resp: 18   Temp: 97.7 °F (36.5 °C)   SpO2: 95%       Physical Exam  Constitutional:       General: She is not in acute distress.     Appearance: Normal appearance.   Cardiovascular:      Rate and Rhythm: Normal rate and regular rhythm.      Pulses: Normal pulses.      Heart sounds: Normal heart sounds.   Pulmonary:      Effort: Pulmonary effort is normal.      Breath sounds: Normal breath sounds.   Chest:      Chest wall: No mass.   Breasts:     Right: No swelling, bleeding, inverted nipple, mass, nipple discharge, skin change or tenderness.      Left: No swelling, bleeding, inverted nipple, mass, nipple discharge, skin change or tenderness.   Abdominal:      General: Abdomen is flat.      Palpations: Abdomen is soft.   Lymphadenopathy:      Upper Body:      Right upper body: No supraclavicular, axillary or pectoral adenopathy.      Left upper body: No supraclavicular, axillary or pectoral adenopathy.   Skin:     General: Skin is warm.   Neurological:      General: No focal deficit present.      Mental Status: She is alert and oriented to person, place, and time.   Psychiatric:         Mood and Affect: Mood normal.         Behavior: Behavior normal.           Results:    Imaging  MRI breast bilateral w and wo contrast w cad    Result Date: 5/31/2024  Narrative: DIAGNOSIS: Increased risk of breast cancer; Breast ductal hyperplasia, atypical TECHNIQUE: MRI of both breasts was performed in axial planes utilizing a combination of localizer, axial T2 STIR, Axial T1 FSPGR in phase, axial dynamic 3D fat suppressed gradient-echo T1 sequences (VIBRANT) before and after contrast administration at multiple time points, and sagittal 3D fat suppressed gradient-echo T1 sequences (VIBRANT) post-contrast. This exam was read in conjunction with [employer] software. MEDICATIONS ADMINISTERED: Gadobutrol injection (SINGLE-DOSE) SOLN 7 mL, Total Given: 7 mL (1 dose) Intravenous  contrast was administered at 2cc/sec, without documented contrast reaction. COMPARISONS: Prior breast imaging dated: 01/08/2024, 01/30/2023, 12/19/2022, 01/29/2022, 11/03/2021, 01/27/2021, 01/27/2021, 01/27/2021, 07/01/2020, 01/06/2020, 12/30/2019, 04/24/2019, 12/27/2018, 04/19/2018, 12/04/2017, 03/03/2017, 05/21/2016, 12/29/2015, 12/02/2014, and 11/22/2013 RELEVANT HISTORY: Family Breast Cancer History: History of breast cancer in Cousin. Family Medical History: Family medical history includes breast cancer in cousin and colon cancer in other. Personal History: Hormone history includes birth control. Surgical history includes breast biopsy, lumpectomy, and breast excisional biopsy. No known relevant medical history. RISK ASSESSMENT: 5 Year Tyrer-Cuzick: 2.08% 10 Year Tyrer-Cuzick: 4.42% Lifetime Tyrer-Cuzick: 5.94%  TISSUE DENSITY: FGT: The breasts have heterogeneous fibroglandular tissue. BPE: No BPE recorded. INDICATION: Tonia Ordaz is a 74 y.o. female presenting for high risk breast ca Breast cancer screening, high risk high risk breast ca. History of atypical ductal hyperplasia.  No reported breast symptoms FINDINGS: Bilateral There are no suspicious enhancing masses or suspicious areas of non-mass enhancement. There is no axillary or internal mammary adenopathy.     Impression:  No MRI evidence of invasive breast malignancy. ASSESSMENT/BI-RADS CATEGORY: Left: 1 - Negative Right: 1 - Negative Overall: 1 - Negative RECOMMENDATION:      - Continue annual screening mammography for both breasts.      - Breast MRI Screening in 1 year for both breasts. Workstation ID: [id number]      I reviewed the above imaging data.      Advance Care Planning/Advance Directives:  Discussed disease status, cancer treatment plans and/or cancer treatment goals with the patient.

## 2024-07-15 ENCOUNTER — OFFICE VISIT (OUTPATIENT)
Dept: URGENT CARE | Age: 74
End: 2024-07-15
Payer: COMMERCIAL

## 2024-07-15 ENCOUNTER — APPOINTMENT (OUTPATIENT)
Dept: RADIOLOGY | Age: 74
End: 2024-07-15
Payer: COMMERCIAL

## 2024-07-15 VITALS
OXYGEN SATURATION: 96 % | DIASTOLIC BLOOD PRESSURE: 80 MMHG | SYSTOLIC BLOOD PRESSURE: 133 MMHG | RESPIRATION RATE: 20 BRPM | TEMPERATURE: 97.9 F | HEART RATE: 82 BPM

## 2024-07-15 DIAGNOSIS — M25.562 ACUTE PAIN OF LEFT KNEE: ICD-10-CM

## 2024-07-15 DIAGNOSIS — M25.562 ACUTE PAIN OF LEFT KNEE: Primary | ICD-10-CM

## 2024-07-15 PROCEDURE — 73564 X-RAY EXAM KNEE 4 OR MORE: CPT

## 2024-07-15 PROCEDURE — 99214 OFFICE O/P EST MOD 30 MIN: CPT | Performed by: STUDENT IN AN ORGANIZED HEALTH CARE EDUCATION/TRAINING PROGRAM

## 2024-07-15 NOTE — PATIENT INSTRUCTIONS
On my read of your x-ray, I do not see any acute fracture no dislocation, I do see some arthritis.  Your symptoms are most consistent with flare of arthritis as well as knee sprain.  However, as we discussed the x-ray shows us the bones but not the ligaments.  We provided you with a knee brace today.  If your symptoms or not improving with rest, elevation, ice, alternating Tylenol and ibuprofen, please call the orthopedic specialist for evaluation with them.  
- - -

## 2024-07-16 ENCOUNTER — OFFICE VISIT (OUTPATIENT)
Dept: OBGYN CLINIC | Facility: CLINIC | Age: 74
End: 2024-07-16
Payer: COMMERCIAL

## 2024-07-16 VITALS — HEIGHT: 64 IN | WEIGHT: 173 LBS | BODY MASS INDEX: 29.53 KG/M2

## 2024-07-16 DIAGNOSIS — M25.562 ACUTE PAIN OF LEFT KNEE: ICD-10-CM

## 2024-07-16 DIAGNOSIS — S76.312A HAMSTRING STRAIN, LEFT, INITIAL ENCOUNTER: Primary | ICD-10-CM

## 2024-07-16 PROCEDURE — 99203 OFFICE O/P NEW LOW 30 MIN: CPT | Performed by: ORTHOPAEDIC SURGERY

## 2024-07-16 NOTE — PROGRESS NOTES
[facility]        NAME: Tonia Ordaz is a 74 y.o. female  : 1950    MRN: [id number]  DATE: July 15, 2024  TIME: 9:09 PM    Assessment and Plan   Acute pain of left knee [M25.562]  1. Acute pain of left knee  Ambulatory Referral to Orthopedic Surgery    CANCELED: XR knee 3 vw left non injury      Likely medial collateral ligament sprain, provided hinged knee brace and crutches as she is having difficulty with ambulation.  No acute fracture, dislocation my initial read of her x-ray, radiology read pending.  If her symptoms are not improving, case, because she will schedule with orthopedic surgery for further evaluation.      Patient Instructions   On my read of your x-ray, I do not see any acute fracture no dislocation, I do see some arthritis.  Your symptoms are most consistent with flare of arthritis as well as knee sprain.  However, as we discussed the x-ray shows us the bones but not the ligaments.  We provided you with a knee brace today.  If your symptoms or not improving with rest, elevation, ice, alternating Tylenol and ibuprofen, please call the orthopedic specialist for evaluation with them.    Follow up with PCP in 3-5 days.  Proceed to  ER if symptoms worsen.    If tests have been performed at [facility], our office will contact you with results if changes need to be made to the care plan discussed with you at the visit.  [facility]    Chief Complaint     Chief Complaint   Patient presents with    Knee Pain     Patient reports was painting today , when went to step off of the ladder felt a shooting pain fromlleft  knee. Reports pain with weight bearing. Has been moving and lifting and bending a lot over the past couple of weeks left knee has been aching , took 2 motrin this morning @ 8am for pain.          History of Present Illness       Patient presents for left knee pain.  She has been having medial left knee soreness for the past couple  weeks that she has been very active with home improvements.  Today, she stepped down from a ladder and felt sharp pain to the same area of her medial left knee.  She did not fall to the ground, able to bear weight but with difficulty.          Review of Systems   Review of Systems   All other systems reviewed and are negative.        Current Medications       Current Outpatient Medications:     ALPRAZolam (XANAX) 0.5 mg tablet, Take 1 tablet (0.5 mg total) by mouth daily at bedtime as needed for anxiety, Disp: 30 tablet, Rfl: 0    cholecalciferol (VITAMIN D3) 1,000 units tablet, Take 1,000 Units by mouth daily, Disp: , Rfl:     Current Allergies     Allergies as of 07/15/2024 - Reviewed 07/15/2024   Allergen Reaction Noted    Erythromycin Other (See Comments) 08/09/2016    Naproxen Abdominal Pain 05/04/2016    Nickel Itching 01/12/2021            The following portions of the patient's history were reviewed and updated as appropriate: allergies, current medications, past family history, past medical history, past social history, past surgical history and problem list.     Past Medical History:   Diagnosis Date    Allergic     Already in histort    Anxiety 1995    Infrequent    Breast disorder 2010    Cancer ([facility]) 2014    Melanoma ([facility]) 2014    Shingles 2016       Past Surgical History:   Procedure Laterality Date    BREAST BIOPSY Right 03/23/2012    US guided benign    BREAST BIOPSY Right 11/23/2010    benign    BREAST CYST EXCISION Left 12/23/2010    benign    BREAST LUMPECTOMY  2016    COLONOSCOPY  2016    Updated 2022       Family History   Problem Relation Age of Onset    No Known Problems Mother     Depression Father     Coronary artery disease Father     No Known Problems Sister     No Known Problems Maternal Grandmother     No Known Problems Maternal Grandfather     Pancreatic cancer Paternal Grandmother 75    Cancer Paternal Grandmother     No Known Problems Paternal Grandfather     No Known Problems Brother      No Known Problems Brother     No Known Problems Brother     No Known Problems Son     No Known Problems Son     Uterine cancer Paternal Aunt         50's    Breast cancer Cousin         70's    Colon cancer Other 34         Medications have been verified.        Objective   /80   Pulse 82   Temp 97.9 °F (36.6 °C) (Tympanic)   Resp 20   LMP  (LMP Unknown)   SpO2 96%   No LMP recorded (lmp unknown). Patient is postmenopausal.       Physical Exam     Physical Exam  Vitals and nursing note reviewed.   Constitutional:       General: She is not in acute distress.     Appearance: She is not toxic-appearing.      Comments: Antalgic gait   HENT:      Head: Normocephalic and atraumatic.      Right Ear: External ear normal.      Left Ear: External ear normal.      Nose: Nose normal.      Mouth/Throat:      Mouth: Mucous membranes are moist.   Eyes:      Extraocular Movements: Extraocular movements intact.      Conjunctiva/sclera: Conjunctivae normal.   Musculoskeletal:         General: Tenderness present. No deformity.      Comments: Tender along medial joint line  Full active extension  Grossly stable with varus/valgus stress/ anterior posterior drawer  Negative arely/thessaly   Skin:     General: Skin is warm and dry.   Neurological:      Mental Status: She is alert.

## 2024-07-16 NOTE — PROGRESS NOTES
CHIEF COMPLAINT/REASON FOR VISIT  Chief Complaint   Patient presents with    Left Knee - Pain        HISTORY OF PRESENT ILLNESS  Tonia Ordaz is a 74 y.o. female who presents for evaluation of her left knee. Patient presented to the Urgent care on 7/15/24 where she was given crutches and a knee brace. Patient was on a ladder when she reached back which caused her left knee to twist. Described all of her pain medially. Pain at rest was rated as a 0/10 pq at rest. Putting pressure on her leg was rated as a 6/10 PQ and sharp. Internal rotation caused medial knee pain. Patient took Motrin with minimal relief.     REVIEW OF SYSTEMS  Review of systems was performed and, outside that mentioned in the HPI, it was negative for symptomology related to the integumentary, hematologic, immunologic, allergic, neurologic, cardiovascular, respiratory, GI or  systems.    MEDICAL HISTORY  Patient Active Problem List   Diagnosis    Anxiety    Arthritis    Breast ductal hyperplasia, atypical    Esophageal reflux    Headache, unspecified headache type    Melanoma of skin (HCC)    Vasovagal syncope    Abnormal finding on breast imaging    Family history of pancreatic cancer    Increased risk of breast cancer    Breast pain, left       SURGICAL HISTORY  Past Surgical History:   Procedure Laterality Date    BREAST BIOPSY Right 03/23/2012    US guided benign    BREAST BIOPSY Right 11/23/2010    benign    BREAST CYST EXCISION Left 12/23/2010    benign    BREAST LUMPECTOMY  2016    COLONOSCOPY  2016    Updated 2022       CURRENT MEDICATIONS    Current Outpatient Medications:     ALPRAZolam (XANAX) 0.5 mg tablet, Take 1 tablet (0.5 mg total) by mouth daily at bedtime as needed for anxiety, Disp: 30 tablet, Rfl: 0    cholecalciferol (VITAMIN D3) 1,000 units tablet, Take 1,000 Units by mouth daily, Disp: , Rfl:     SOCIAL HISTORY  Social History     Socioeconomic History    Marital status: /Civil Union     Spouse name: Not on file  "   Number of children: Not on file    Years of education: Not on file    Highest education level: Not on file   Occupational History    Not on file   Tobacco Use    Smoking status: Former     Current packs/day: 0.00     Average packs/day: 1 pack/day for 10.0 years (10.0 ttl pk-yrs)     Types: Cigarettes     Start date: 10/10/1974     Quit date: 10/10/1984     Years since quittin.7    Smokeless tobacco: Never   Vaping Use    Vaping status: Never Used   Substance and Sexual Activity    Alcohol use: Yes     Alcohol/week: 2.0 standard drinks of alcohol     Types: 2 Glasses of wine per week    Drug use: Never    Sexual activity: Yes     Partners: Male     Comment: Not applicable   Other Topics Concern    Not on file   Social History Narrative    Not on file     Social Determinants of Health     Financial Resource Strain: Low Risk  (2023)    Overall Financial Resource Strain (CARDIA)     Difficulty of Paying Living Expenses: Not hard at all   Food Insecurity: Not on file   Transportation Needs: No Transportation Needs (2023)    PRAPARE - Transportation     Lack of Transportation (Medical): No     Lack of Transportation (Non-Medical): No   Physical Activity: Not on file   Stress: Not on file   Social Connections: Unknown (2024)    Received from [employer]     How often do you feel lonely or isolated from those around you? (Adult - for ages 18 years and over): Not on file   Intimate Partner Violence: Not on file   Housing Stability: Not on file       Objective     VITAL SIGNS  Ht 5' 4\" (1.626 m)   Wt 78.5 kg (173 lb)   LMP  (LMP Unknown)   BMI 29.70 kg/m²      PHYSICAL EXAMINATION    Musculoskeletal: Left Knee Examination:  General: The patient is alert, oriented, and pleasant to interact with.  Patient ambulates with Normal and Antalgic gait pattern  Assistive Device: Wheelchair  Alignment: mild varus  Skin is warm and dry to touch with no signs of erythema, ecchymosis, or " infection   Effusion: minimal  ROM: 0° - 135°  verus 0° - 135° on contralateral side  MMT: 5/5 throughout  TTP: Medial joint line  Flexor and extensor mechanisms are intact   Knee is stable to varus and valgus stress  Edin's Test Positive   Lachman's Test - 1A  Anterior Drawer Test - 1A  Posterior Drawer Test - 1A  Pivot Shift - 0  Lateral Patellar Glide - 1/4  Medial Patellar Glide - 1/4  Patella tracks centrally without palpable crepitus  Calf compartments are soft and supple  negative Anyi's sign  2+ DP and PT pulses with brisk capillary refill to the toes  Sural, saphenous, tibial, superficial, and deep peroneal motor and sensory distributions intact  Sensation light touch intact distally    RADIOGRAPHIC EXAMINATION/DIAGNOSTICS:  Procedure: XR knee 4+ vw left injury    Result Date: 7/16/2024  Narrative: XR KNEE 4+ VW LEFT INJURY INDICATION: M25.562: Pain in left knee. COMPARISON: None FINDINGS: No acute fracture or dislocation. No joint effusion. No significant degenerative changes. No lytic or blastic osseous lesion. Unremarkable soft tissues.     Impression: No acute osseous abnormality. Workstation performed: [id number]     Procedure: MRI breast bilateral w and wo contrast w cad    Result Date: 5/31/2024  Narrative: DIAGNOSIS: Increased risk of breast cancer; Breast ductal hyperplasia, atypical TECHNIQUE: MRI of both breasts was performed in axial planes utilizing a combination of localizer, axial T2 STIR, Axial T1 FSPGR in phase, axial dynamic 3D fat suppressed gradient-echo T1 sequences (VIBRANT) before and after contrast administration at multiple time points, and sagittal 3D fat suppressed gradient-echo T1 sequences (VIBRANT) post-contrast. This exam was read in conjunction with [employer] software. MEDICATIONS ADMINISTERED: Gadobutrol injection (SINGLE-DOSE) SOLN 7 mL, Total Given: 7 mL (1 dose) Intravenous contrast was administered at 2cc/sec, without documented contrast reaction. COMPARISONS: Prior  breast imaging dated: 01/08/2024, 01/30/2023, 12/19/2022, 01/29/2022, 11/03/2021, 01/27/2021, 01/27/2021, 01/27/2021, 07/01/2020, 01/06/2020, 12/30/2019, 04/24/2019, 12/27/2018, 04/19/2018, 12/04/2017, 03/03/2017, 05/21/2016, 12/29/2015, 12/02/2014, and 11/22/2013 RELEVANT HISTORY: Family Breast Cancer History: History of breast cancer in Cousin. Family Medical History: Family medical history includes breast cancer in cousin and colon cancer in other. Personal History: Hormone history includes birth control. Surgical history includes breast biopsy, lumpectomy, and breast excisional biopsy. No known relevant medical history. RISK ASSESSMENT: 5 Year Tyrer-Cuzick: 2.08% 10 Year Tyrer-Cuzick: 4.42% Lifetime Tyrer-Cuzick: 5.94%  TISSUE DENSITY: FGT: The breasts have heterogeneous fibroglandular tissue. BPE: No BPE recorded. INDICATION: Tonia Ordaz is a 74 y.o. female presenting for high risk breast ca Breast cancer screening, high risk high risk breast ca. History of atypical ductal hyperplasia.  No reported breast symptoms FINDINGS: Bilateral There are no suspicious enhancing masses or suspicious areas of non-mass enhancement. There is no axillary or internal mammary adenopathy.     Impression:  No MRI evidence of invasive breast malignancy. ASSESSMENT/BI-RADS CATEGORY: Left: 1 - Negative Right: 1 - Negative Overall: 1 - Negative RECOMMENDATION:      - Continue annual screening mammography for both breasts.      - Breast MRI Screening in 1 year for both breasts. Workstation ID: [id number]     ASSESSMENT/PLAN:  Left knee hamstring strain; meniscus tear  Rest was recommended to prevent the hamstring from worsening  Mobic was ordered for pain.   Please call the clinic if symptoms worsens or any questions arise        Scribe Attestation      I,:  Aureliano Meeks am acting as a scribe while in the presence of the attending physician.:       I,:  Zak Galicia MD personally performed the services described in this  documentation    as scribed in my presence.:

## 2024-07-17 ENCOUNTER — TELEPHONE (OUTPATIENT)
Dept: OBGYN CLINIC | Facility: HOSPITAL | Age: 74
End: 2024-07-17

## 2024-07-17 NOTE — TELEPHONE ENCOUNTER
Patient's  stopped by asking for status of Meloxicam RX, plese call patient to discuss [phone]

## 2024-07-17 NOTE — TELEPHONE ENCOUNTER
Caller: Patient    Doctor: Frida    Reason for call: Patient was in yesterday and you were going to call in a prescription for her for Meloxicam but it was never sent to her pharmacy. Can you please send prescription to  [id number] - [place], [place] - [address]   Please call patient when it is done. Thank you.    Call back#: [phone]

## 2024-07-17 NOTE — TELEPHONE ENCOUNTER
Caller: patient    Doctor: fady    Reason for call: Patient calling to check status on the prescription for meloxicam, please send prescription to     [id number] - [place], [place] - [address] [[id number]]     Please call patient when prescription has been sent, thank you     Call back#: [phone]

## 2024-07-18 DIAGNOSIS — M25.562 ACUTE PAIN OF LEFT KNEE: Primary | ICD-10-CM

## 2024-07-18 RX ORDER — MELOXICAM 7.5 MG/1
7.5 TABLET ORAL DAILY
Qty: 30 TABLET | Refills: 0 | Status: SHIPPED | OUTPATIENT
Start: 2024-07-18

## 2024-07-22 RX ORDER — MELOXICAM 15 MG/1
15 TABLET ORAL DAILY
Qty: 15 TABLET | Refills: 1 | Status: SHIPPED | OUTPATIENT
Start: 2024-07-22

## 2024-10-02 ENCOUNTER — VBI (OUTPATIENT)
Dept: ADMINISTRATIVE | Facility: OTHER | Age: 74
End: 2024-10-02

## 2024-10-02 NOTE — TELEPHONE ENCOUNTER
10/02/24 10:56 AM     Chart reviewed for CRC: Colonoscopy was/were not submitted to the patient's insurance.     Tamera Ferrari MA   [facility]

## 2024-12-27 ENCOUNTER — VBI (OUTPATIENT)
Dept: ADMINISTRATIVE | Facility: OTHER | Age: 74
End: 2024-12-27

## 2024-12-27 NOTE — TELEPHONE ENCOUNTER
12/27/24 4:55 PM     Chart reviewed for CRC: Colonoscopy was/were not submitted to the patient's insurance.     Audrey Ernst MA   [facility]

## 2025-01-09 ENCOUNTER — TELEPHONE (OUTPATIENT)
Dept: SURGICAL ONCOLOGY | Facility: CLINIC | Age: 75
End: 2025-01-09

## 2025-01-09 NOTE — TELEPHONE ENCOUNTER
"Subjective   Patient ID: Brigitte Lovell is a 75 y.o. female who presents for 4 month follow up, Chronic lower back pain, and A1C .    This is a 75 year old female here to f/u on HTN,HLD ,IFG,sleep apnea,OA,GERD,h/o colon adenoma,pt sees,derm ,she sees Dr smith for her thyroid nodule but he retired and will see Dr Carlos Aguilar.  has been exercising regularly.no CP,SOB.  she saw nutritionist and lost some weight.  In past she saw DR Blue,also saw someone at [facility],she has cataract being monitored..  she c/o fatigue.had sleep studies in 2016 and was \"borderline abnormal\". she self stopped using her C PAP machine,she could not tolerate it,she has been exercising .  she had mild covid infection in December 2021,symptoms resolved.  she takes her meds regularly.     She c/o pain left low back/left posterior hip x 2 months,on Mobic  with partial improvement,no leg pain or numbness,no injury.              Review of Systems   Reason unable to perform ROS: as HPI.       Objective   /75 (BP Location: Right arm, Patient Position: Sitting, BP Cuff Size: Large adult)   Pulse 70   Temp 36.7 °C (98.1 °F) (Temporal)   Ht 1.626 m (5' 4\")   Wt 79 kg (174 lb 3.2 oz)   SpO2 93%   BMI 29.90 kg/m²     Physical Exam  Constitutional:       Appearance: Normal appearance.   HENT:      Head: Normocephalic and atraumatic.   Eyes:      Extraocular Movements: Extraocular movements intact.      Pupils: Pupils are equal, round, and reactive to light.   Cardiovascular:      Rate and Rhythm: Normal rate and regular rhythm.      Heart sounds: Normal heart sounds.   Pulmonary:      Effort: Pulmonary effort is normal.      Breath sounds: Normal breath sounds. No wheezing or rhonchi.   Abdominal:      General: Abdomen is flat. There is no distension.      Palpations: Abdomen is soft.   Musculoskeletal:      Cervical back: Normal range of motion and neck supple.      Right lower leg: No edema.      Left lower leg: No edema.      " Called central scheduling and had Mammo screening cancelled 1/14/25, due to [place], has another provider.    Comments: Pain over left hip nposteriorly with deep palpation,left hip good ROM.   Skin:     General: Skin is warm.   Neurological:      General: No focal deficit present.      Mental Status: She is alert and oriented to person, place, and time.   Psychiatric:         Mood and Affect: Mood normal.         Behavior: Behavior normal.         Assessment/Plan   Problem List Items Addressed This Visit       Benign essential hypertension     Stable on Lisinopril.         Dyslipidemia, goal LDL below 130     On statin and low fat diet.         Impaired fasting glucose - Primary     Check A1C today.  Follow 1800 nicole ADA diet.         Solitary thyroid nodule     Pt to see endo in August 23.         Chronic left-sided low back pain without sciatica     Stretching exercise to back as per provided handout.  Take Tylenol and use OTC Lidocaine patch.  Order XRAY.  Can take Meloxicam infrequently,can cause kidney damage,can cause  stomach ulcer and can raise BP.         Relevant Orders    XR lumbar spine 2-3 views    Pain of left hip    Relevant Orders    XR hip left 2 or 3 views    Overweight (BMI 25.0-29.9)     BMI improved,continue diet and exercise.

## 2025-04-22 ENCOUNTER — VBI (OUTPATIENT)
Dept: ADMINISTRATIVE | Facility: OTHER | Age: 75
End: 2025-04-22

## 2025-04-22 NOTE — TELEPHONE ENCOUNTER
Patient contacted to schedule Annual Wellness Visit.   A message was left for the patient to return the call.    Thank you.  Zuleika Galindo MA  [facility]  [facility]